# Patient Record
Sex: MALE | Race: WHITE | Employment: FULL TIME | URBAN - METROPOLITAN AREA
[De-identification: names, ages, dates, MRNs, and addresses within clinical notes are randomized per-mention and may not be internally consistent; named-entity substitution may affect disease eponyms.]

---

## 2017-04-03 ENCOUNTER — OFFICE VISIT (OUTPATIENT)
Dept: PULMONOLOGY | Facility: CLINIC | Age: 37
End: 2017-04-03

## 2017-04-03 VITALS
HEIGHT: 66 IN | WEIGHT: 205.19 LBS | BODY MASS INDEX: 32.98 KG/M2 | RESPIRATION RATE: 18 BRPM | SYSTOLIC BLOOD PRESSURE: 129 MMHG | DIASTOLIC BLOOD PRESSURE: 84 MMHG | HEART RATE: 85 BPM | OXYGEN SATURATION: 97 %

## 2017-04-03 DIAGNOSIS — G91.9 HYDROCEPHALY (HCC): Primary | ICD-10-CM

## 2017-04-03 PROCEDURE — 99213 OFFICE O/P EST LOW 20 MIN: CPT | Performed by: INTERNAL MEDICINE

## 2017-04-03 PROCEDURE — 99212 OFFICE O/P EST SF 10 MIN: CPT | Performed by: INTERNAL MEDICINE

## 2017-04-03 NOTE — PROGRESS NOTES
The patient is 79-year-old male who I know well from prior evaluation comes in now for follow-up. In general, he is doing well. 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   He returns now to

## 2017-04-21 NOTE — TELEPHONE ENCOUNTER
Pts mother called for refill:    Current outpatient prescriptions:   •  EPIPEN 2-MADY 0.3 MG/0.3ML Injection Solution Auto-injector, USE AS DIRECTED, Disp: 2 each, Rfl: 0

## 2017-04-22 RX ORDER — EPINEPHRINE 0.3 MG/.3ML
INJECTION SUBCUTANEOUS
Qty: 2 EACH | Refills: 0 | Status: SHIPPED | OUTPATIENT
Start: 2017-04-22 | End: 2018-05-10

## 2017-10-02 PROBLEM — R45.4 ANGER: Status: ACTIVE | Noted: 2017-10-02

## 2017-10-02 NOTE — PROGRESS NOTES
The patient is a 45-year-old male who comes in now noting that he has been getting angry and very small issues in his life. He specifically requests medication therapy. Otherwise, he is been doing fairly well. There is no further pain with swallowing.

## 2017-11-27 ENCOUNTER — TELEPHONE (OUTPATIENT)
Dept: PULMONOLOGY | Facility: CLINIC | Age: 37
End: 2017-11-27

## 2018-04-20 NOTE — TELEPHONE ENCOUNTER
Last seen 10-7-17  Last refill 10-2-17  Rx routed to [facility] for sign off.

## 2018-04-23 RX ORDER — ESCITALOPRAM OXALATE 10 MG/1
TABLET ORAL
Qty: 30 TABLET | Refills: 3 | Status: SHIPPED | OUTPATIENT
Start: 2018-04-23 | End: 2018-09-11

## 2018-05-11 NOTE — TELEPHONE ENCOUNTER
Last seen 10/02/2017  Last refill 04/22/2017  Routed to [place] for sign off

## 2018-05-12 RX ORDER — EPINEPHRINE 0.3 MG/.3ML
INJECTION SUBCUTANEOUS
Qty: 2 EACH | Refills: 0 | Status: SHIPPED | OUTPATIENT
Start: 2018-05-12 | End: 2021-05-05

## 2018-05-18 ENCOUNTER — TELEPHONE (OUTPATIENT)
Dept: PULMONOLOGY | Facility: CLINIC | Age: 38
End: 2018-05-18

## 2018-05-18 NOTE — TELEPHONE ENCOUNTER
Pt informed of below. Pt states he will call the [facility] in [facility] to have prescription transferred.

## 2018-05-18 NOTE — TELEPHONE ENCOUNTER
Last OV 10-2-17. Last refill 5-12-18. Shonna Gerard from [place] [address] states they did receive prescription on 5-12-18 but are currently on backorder. Shonna Gerard states the [address] [facility] do have the medication in stock.   R

## 2018-05-18 NOTE — TELEPHONE ENCOUNTER
Pt requesting refill for rx:EPINEPHRINE, indicates the current one he has , pls call pt at:[phone],thanks.   Current Outpatient Prescriptions:   •  EPINEPHRINE 0.3 MG/0.3ML Injection Solution Auto-injector, USE AS DIRECTED, Disp: 2 each, Rfl: 0

## 2018-09-12 RX ORDER — ESCITALOPRAM OXALATE 10 MG/1
TABLET ORAL
Qty: 30 TABLET | Refills: 3 | Status: SHIPPED | OUTPATIENT
Start: 2018-09-12 | End: 2019-02-25

## 2019-02-09 ENCOUNTER — HOSPITAL ENCOUNTER (OUTPATIENT)
Dept: GENERAL RADIOLOGY | Age: 39
Discharge: HOME | End: 2019-02-09
Attending: PODIATRIST
Payer: COMMERCIAL

## 2019-02-09 DIAGNOSIS — G89.29 CHRONIC PAIN OF LEFT HEEL: ICD-10-CM

## 2019-02-09 DIAGNOSIS — M79.672 CHRONIC PAIN OF LEFT HEEL: ICD-10-CM

## 2019-02-09 DIAGNOSIS — M72.2 PLANTAR FASCIITIS OF LEFT FOOT: ICD-10-CM

## 2019-02-09 PROCEDURE — 73630 X-RAY EXAM OF FOOT: CPT | Performed by: PODIATRIST

## 2019-02-25 RX ORDER — ESCITALOPRAM OXALATE 10 MG/1
TABLET ORAL
Qty: 30 TABLET | Refills: 0 | Status: SHIPPED | OUTPATIENT
Start: 2019-02-25 | End: 2019-03-27

## 2019-03-28 RX ORDER — ESCITALOPRAM OXALATE 10 MG/1
TABLET ORAL
Qty: 30 TABLET | Refills: 0 | Status: SHIPPED | OUTPATIENT
Start: 2019-03-28 | End: 2019-04-22

## 2019-03-28 NOTE — TELEPHONE ENCOUNTER
Spoke with pt. Pt states he picked up his prescription today. Pt informed last office visit was October 2017 and follow-up visit needed for additional refills. Appointment made for Monday April 22 at 4:30 PM. Informed pt of date, place, and time.  Pt verbal

## 2019-03-28 NOTE — TELEPHONE ENCOUNTER
Last office visit 10-2-17  Last refill 2-25-19    Pt needs follow-up appointment for additional refills.

## 2019-04-22 NOTE — PROGRESS NOTES
The patient is a 65-year-old male who comes in now for follow-up. He notes that he still has some issues with depression and anger. He notes that the Lexapro has helped somewhat but incompletely.   He does not want to pursue psychiatry evaluation at this

## 2019-05-11 ENCOUNTER — OFFICE VISIT (OUTPATIENT)
Dept: SLEEP CENTER | Age: 39
End: 2019-05-11
Attending: INTERNAL MEDICINE
Payer: COMMERCIAL

## 2019-05-11 DIAGNOSIS — G47.33 OSA (OBSTRUCTIVE SLEEP APNEA): ICD-10-CM

## 2019-05-11 PROCEDURE — 95810 POLYSOM 6/> YRS 4/> PARAM: CPT

## 2019-05-15 NOTE — PROCEDURES
[facility]  Accredited by the [facility] ([facility])    PATIENT'S NAME: Jessica Castellon   ATTENDING PHYSICIAN: Ruben Rodriguez MD   REFERRING PHYSICIAN: Ruben Rodriguez MD   PATIENT ACCOUNT #: [de-identified] LOCATION: Sleep index 33.3 events per hour for a combined arousal index of 48.8 events per hour.   There were 37 periodic limb movements for a periodic limb movement index of 6.8 events per hour of which 2.9 per hour were associated with arousal.  The lowest desaturation w

## 2019-05-16 ENCOUNTER — TELEPHONE (OUTPATIENT)
Dept: PULMONOLOGY | Facility: CLINIC | Age: 39
End: 2019-05-16

## 2019-05-16 DIAGNOSIS — G47.33 OSA (OBSTRUCTIVE SLEEP APNEA): Primary | ICD-10-CM

## 2019-05-16 NOTE — TELEPHONE ENCOUNTER
----- Message from Franklyn Beckman MD sent at 5/15/2019  9:09 PM CDT -----  RN, please call the patient to let him know that he has mild obstructive sleep apnea which becomes severe in rem sleep. Please facilitate  CPAP titration.

## 2019-05-17 NOTE — TELEPHONE ENCOUNTER
Spoke with pt who given the ok to speak with his mom regarding [address] message below. Pt Mom voiced understanding and states they already have the number to sleep lab.

## 2019-05-31 ENCOUNTER — TELEPHONE (OUTPATIENT)
Dept: PULMONOLOGY | Facility: CLINIC | Age: 39
End: 2019-05-31

## 2019-07-01 ENCOUNTER — HOSPITAL ENCOUNTER (OUTPATIENT)
Dept: CV DIAGNOSTICS | Facility: HOSPITAL | Age: 39
Discharge: HOME | End: 2019-07-01
Attending: INTERNAL MEDICINE
Payer: COMMERCIAL

## 2019-07-01 DIAGNOSIS — I42.9 CARDIOMYOPATHY, UNSPECIFIED TYPE (HCC): ICD-10-CM

## 2019-07-01 PROCEDURE — 93017 CV STRESS TEST TRACING ONLY: CPT | Performed by: INTERNAL MEDICINE

## 2019-07-01 PROCEDURE — 93016 CV STRESS TEST SUPVJ ONLY: CPT | Performed by: INTERNAL MEDICINE

## 2019-07-01 PROCEDURE — 93018 CV STRESS TEST I&R ONLY: CPT | Performed by: INTERNAL MEDICINE

## 2019-07-09 ENCOUNTER — TELEPHONE (OUTPATIENT)
Dept: PULMONOLOGY | Facility: CLINIC | Age: 39
End: 2019-07-09

## 2019-07-09 NOTE — TELEPHONE ENCOUNTER
----- Message from Ryan Hyde MD sent at 7/5/2019  3:39 PM CDT -----  RN, please let the patient know that his EKG simply shows extra heartbeats.

## 2019-09-27 RX ORDER — ESCITALOPRAM OXALATE 10 MG/1
TABLET ORAL
Qty: 30 TABLET | Refills: 0 | Status: SHIPPED | OUTPATIENT
Start: 2019-09-27 | End: 2019-10-17

## 2019-10-19 RX ORDER — ESCITALOPRAM OXALATE 10 MG/1
TABLET ORAL
Qty: 30 TABLET | Refills: 0 | Status: SHIPPED | OUTPATIENT
Start: 2019-10-19 | End: 2020-01-30

## 2019-11-05 ENCOUNTER — TELEPHONE (OUTPATIENT)
Dept: PULMONOLOGY | Facility: CLINIC | Age: 39
End: 2019-11-05

## 2019-11-05 RX ORDER — ALPRAZOLAM 0.5 MG/1
0.5 TABLET ORAL EVERY 6 HOURS PRN
Qty: 30 TABLET | Refills: 2 | Status: SHIPPED | OUTPATIENT
Start: 2019-11-05 | End: 2020-01-23

## 2019-11-05 NOTE — TELEPHONE ENCOUNTER
Pt informed of Dr. Dar Mcgee message/order below. Prescription faxed to [facility] [phone]. Dr. Adi Chavez- Pt wondering if he should continue taking Lexapro 20mg daily if he starts taking Xanax.

## 2019-11-05 NOTE — TELEPHONE ENCOUNTER
Spoke with pt regarding message below. Pt states he has been feeling stressed, angry, and mad because his mom is in hospice and is disappointed with mom's oncologist. Pt denies suicidal thoughts and no harm to self or others.  Pt currently takes Lexapro 20m

## 2019-11-05 NOTE — TELEPHONE ENCOUNTER
Pts daughter Tenzin Celestin states that pts mother is in hospice and pt is having a hard time dealing with it. She would like to know if pt can have RX to help with stress/nerves. Please call.

## 2019-11-05 NOTE — TELEPHONE ENCOUNTER
RN, is okay from my perspective for the patient to use Xanax 0.5 mg every 6 as needed #30, refill x2.

## 2019-11-18 RX ORDER — ESCITALOPRAM OXALATE 10 MG/1
TABLET ORAL
Qty: 30 TABLET | Refills: 0 | Status: SHIPPED | OUTPATIENT
Start: 2019-11-18 | End: 2019-12-23

## 2019-12-23 RX ORDER — ESCITALOPRAM OXALATE 10 MG/1
TABLET ORAL
Qty: 60 TABLET | Refills: 0 | Status: SHIPPED | OUTPATIENT
Start: 2019-12-23 | End: 2020-01-30

## 2020-01-23 RX ORDER — ALPRAZOLAM 0.5 MG/1
TABLET ORAL
Qty: 30 TABLET | Refills: 0 | Status: SHIPPED | OUTPATIENT
Start: 2020-01-23 | End: 2020-02-20

## 2020-01-30 RX ORDER — ESCITALOPRAM OXALATE 10 MG/1
20 TABLET ORAL
Qty: 60 TABLET | Refills: 0 | Status: SHIPPED | OUTPATIENT
Start: 2020-01-30 | End: 2020-01-31

## 2020-01-30 NOTE — TELEPHONE ENCOUNTER
Current Outpatient Medications   Medication Sig Dispense Refill   • ESCITALOPRAM 10 MG Oral Tab TAKE 2 TABLETS BY MOUTH DAILY.  60 tablet 0

## 2020-01-31 ENCOUNTER — TELEPHONE (OUTPATIENT)
Dept: PULMONOLOGY | Facility: CLINIC | Age: 40
End: 2020-01-31

## 2020-01-31 RX ORDER — ESCITALOPRAM OXALATE 20 MG/1
20 TABLET ORAL DAILY
Qty: 30 TABLET | Refills: 0 | Status: SHIPPED | OUTPATIENT
Start: 2020-01-31 | End: 2020-02-27

## 2020-01-31 NOTE — TELEPHONE ENCOUNTER
Current Outpatient Medications   Medication Sig Dispense Refill   • escitalopram 10 MG Oral Tab Take 2 tablets (20 mg total) by mouth once daily.  60 tablet 0     Per pharmacy pt's plan will charge a lower co-pay if prescription is changed to a higher stren

## 2020-01-31 NOTE — TELEPHONE ENCOUNTER
Refill for escitalopram 10mg tabs take 2 tabs daily, #60 dispensed signed by [facility] 1/30/20. Rx discontinued and Rx at the same dosing (20 mg daily, #30) sent to pharmacy.

## 2020-02-21 RX ORDER — ALPRAZOLAM 0.5 MG/1
TABLET ORAL
Qty: 30 TABLET | Refills: 0 | Status: SHIPPED | OUTPATIENT
Start: 2020-02-21 | End: 2020-03-25

## 2020-02-27 RX ORDER — ESCITALOPRAM OXALATE 20 MG/1
20 TABLET ORAL DAILY
Qty: 30 TABLET | Refills: 0 | Status: SHIPPED | OUTPATIENT
Start: 2020-02-27 | End: 2020-03-25

## 2020-02-27 NOTE — TELEPHONE ENCOUNTER
Current Outpatient Medications   Medication Sig Dispense Refill   • escitalopram 20 MG Oral Tab Take 1 tablet (20 mg total) by mouth daily.  30 tablet 0

## 2020-02-27 NOTE — TELEPHONE ENCOUNTER
Received refill request for Escitalopram 20 MG- 1 tablet by mouth daily. LOV 4-22-19    Refill [ended and forwarded to Dr. Stormy Hernandes for further directions.

## 2020-03-24 ENCOUNTER — TELEPHONE (OUTPATIENT)
Dept: PULMONOLOGY | Facility: CLINIC | Age: 40
End: 2020-03-24

## 2020-03-25 RX ORDER — ESCITALOPRAM OXALATE 20 MG/1
TABLET ORAL
Qty: 30 TABLET | Refills: 0 | Status: SHIPPED | OUTPATIENT
Start: 2020-03-25 | End: 2020-04-29

## 2020-03-25 RX ORDER — ALPRAZOLAM 0.5 MG/1
TABLET ORAL
Qty: 30 TABLET | Refills: 0 | Status: SHIPPED | OUTPATIENT
Start: 2020-03-25 | End: 2020-06-02

## 2020-04-29 RX ORDER — ESCITALOPRAM OXALATE 20 MG/1
20 TABLET ORAL DAILY
Qty: 30 TABLET | Refills: 0 | Status: SHIPPED | OUTPATIENT
Start: 2020-04-29 | End: 2020-06-05

## 2020-04-29 NOTE — TELEPHONE ENCOUNTER
Current Outpatient Medications   Medication Sig Dispense Refill   • ESCITALOPRAM 20 MG Oral Tab TAKE 1 TABLET(20 MG) BY MOUTH DAILY 30 tablet 0

## 2020-06-02 RX ORDER — ALPRAZOLAM 0.5 MG/1
TABLET ORAL
Qty: 30 TABLET | Refills: 0 | Status: SHIPPED | OUTPATIENT
Start: 2020-06-02 | End: 2020-07-02

## 2020-06-05 RX ORDER — ESCITALOPRAM OXALATE 20 MG/1
20 TABLET ORAL DAILY
Qty: 30 TABLET | Refills: 5 | Status: SHIPPED | OUTPATIENT
Start: 2020-06-05 | End: 2020-12-05

## 2020-07-02 RX ORDER — ALPRAZOLAM 0.5 MG/1
0.5 TABLET ORAL EVERY 6 HOURS PRN
Qty: 30 TABLET | Refills: 2 | Status: SHIPPED
Start: 2020-07-02 | End: 2020-10-21

## 2020-07-02 NOTE — TELEPHONE ENCOUNTER
Current Outpatient Medications   Medication Sig Dispense Refill   • ALPRAZOLAM 0.5 MG Oral Tab TAKE 1 TABLET BY MOUTH EVERY 6 HOURS AS NEEDED 30 tablet 0

## 2020-10-21 RX ORDER — ALPRAZOLAM 0.5 MG/1
0.5 TABLET ORAL EVERY 6 HOURS PRN
Qty: 30 TABLET | Refills: 0 | Status: SHIPPED | OUTPATIENT
Start: 2020-10-21 | End: 2021-01-14

## 2020-10-21 NOTE — TELEPHONE ENCOUNTER
Jean Al •  ALPRAZolam 0.5 MG Oral Tab, Take 1 tablet (0.5 mg total) by mouth every 6 (six) hours as needed. , Disp: 30 tablet, Rfl: 2

## 2020-10-21 NOTE — TELEPHONE ENCOUNTER
LOV 4/22/19  Last refill: 7/2/20, #30, 2 refills. Spoke with patient and booked appt for 1/25/21. Dr. Alla Camarena, patient requesting lab orders prior to appt.

## 2020-12-04 NOTE — TELEPHONE ENCOUNTER
Current Outpatient Medications   Medication Sig Dispense Refill   • escitalopram 20 MG Oral Tab Take 1 tablet (20 mg total) by mouth daily.  30 tablet 5

## 2020-12-05 RX ORDER — ESCITALOPRAM OXALATE 20 MG/1
20 TABLET ORAL DAILY
Qty: 30 TABLET | Refills: 5 | Status: SHIPPED | OUTPATIENT
Start: 2020-12-05

## 2021-01-04 ENCOUNTER — TELEPHONE (OUTPATIENT)
Dept: PULMONOLOGY | Facility: CLINIC | Age: 41
End: 2021-01-04

## 2021-01-07 NOTE — TELEPHONE ENCOUNTER
Last office visit: 4/22/19  Upcoming appointment: 1/25/21    Spoke with patient requesting dosage increase of alprazolam to 10mg. Current dose is 0.5mg PRN, takes daily. Dr. Galileo Narayanan - Patient does not feel any relief with alprazolam 0.5mg dose.   Reques

## 2021-01-07 NOTE — TELEPHONE ENCOUNTER
RN, can increase the current alprazolam dosing to 1.0 mg, not 10 mg. Also, please refer him to Dr. Cookie Ann or Dr. Silvia Curiel. Both are excellent resources.

## 2021-01-08 ENCOUNTER — TELEPHONE (OUTPATIENT)
Dept: PULMONOLOGY | Facility: CLINIC | Age: 41
End: 2021-01-08

## 2021-01-08 NOTE — TELEPHONE ENCOUNTER
[facility] patients Aunt calling to request referral or recommendation for a psychiatrist who would be in network with his insurance. Please call at [phone],[phone].   *informed  not in clinic today

## 2021-01-11 NOTE — TELEPHONE ENCOUNTER
Spoke with patient informed him of Dr. Ambrosio Standing message/order/recommendation. Patient verbalized understanding. Patient states he will inform his aunt that he received recommendation to psychiatrist. See TE 1/8/21.

## 2021-01-14 RX ORDER — ALPRAZOLAM 1 MG/1
1 TABLET ORAL EVERY 6 HOURS PRN
Qty: 30 TABLET | Refills: 0 | Status: SHIPPED | OUTPATIENT
Start: 2021-01-14 | End: 2021-05-10

## 2021-01-14 NOTE — TELEPHONE ENCOUNTER
Pharmacy requesting refill:          Current Outpatient Medications:     •  ALPRAZolam 0.5 MG Oral Tab, Take 1 tablet (0.5 mg total) by mouth every 6 (six) hours as needed. , Disp: 30 tablet, Rfl: 0

## 2021-01-14 NOTE — TELEPHONE ENCOUNTER
Last office visit - 4/22/19  Scheduled visit - 1/25/21  Last refill - 10/21/20    Dr. Steven Turner - Please review pended order for alprazolam.  See TE 1/4/21, dosage increased to 1mg. If agreeable, please sign.

## 2021-01-25 ENCOUNTER — OFFICE VISIT (OUTPATIENT)
Dept: PULMONOLOGY | Facility: CLINIC | Age: 41
End: 2021-01-25
Payer: COMMERCIAL

## 2021-01-25 VITALS
HEART RATE: 77 BPM | DIASTOLIC BLOOD PRESSURE: 90 MMHG | WEIGHT: 204 LBS | HEIGHT: 66 IN | SYSTOLIC BLOOD PRESSURE: 140 MMHG | OXYGEN SATURATION: 97 % | BODY MASS INDEX: 32.78 KG/M2

## 2021-01-25 DIAGNOSIS — E78.5 DYSLIPIDEMIA: ICD-10-CM

## 2021-01-25 DIAGNOSIS — R35.0 FREQUENT URINATION: ICD-10-CM

## 2021-01-25 DIAGNOSIS — G91.9 HYDROCEPHALUS, UNSPECIFIED TYPE (HCC): Primary | ICD-10-CM

## 2021-01-25 PROCEDURE — 3080F DIAST BP >= 90 MM HG: CPT | Performed by: INTERNAL MEDICINE

## 2021-01-25 PROCEDURE — 3077F SYST BP >= 140 MM HG: CPT | Performed by: INTERNAL MEDICINE

## 2021-01-25 PROCEDURE — 3008F BODY MASS INDEX DOCD: CPT | Performed by: INTERNAL MEDICINE

## 2021-01-25 PROCEDURE — 99214 OFFICE O/P EST MOD 30 MIN: CPT | Performed by: INTERNAL MEDICINE

## 2021-01-25 NOTE — PROGRESS NOTES
The patient is 29-year-old male who Ravinder from prior evaluation comes in now for follow-up. He notes that his depression is doing fairly well with the Lexapro 20-minute the higher dose helps.   He was supposed to follow-up with a psychologist Jd Kaba urinalysis as well as glycohemoglobin  8. Mild JACQUELIN–patient to consider follow-up with CPAP. Weight loss, avoid alcohol, avoid sedating drug and never drive if sleepy  9.   Arthritis–Tylenol as needed and be careful about taking too much ibuprofen

## 2021-01-26 ENCOUNTER — LAB ENCOUNTER (OUTPATIENT)
Dept: LAB | Age: 41
End: 2021-01-26
Attending: INTERNAL MEDICINE
Payer: COMMERCIAL

## 2021-01-26 DIAGNOSIS — R35.0 FREQUENT URINATION: ICD-10-CM

## 2021-01-26 DIAGNOSIS — E78.5 DYSLIPIDEMIA: ICD-10-CM

## 2021-01-26 LAB
ANION GAP SERPL CALC-SCNC: 4 MMOL/L (ref 0–18)
BILIRUB UR QL: NEGATIVE
BUN BLD-MCNC: 15 MG/DL (ref 7–18)
BUN/CREAT SERPL: 14.7 (ref 10–20)
CALCIUM BLD-MCNC: 9 MG/DL (ref 8.5–10.1)
CHLORIDE SERPL-SCNC: 105 MMOL/L (ref 98–112)
CHOLEST SMN-MCNC: 240 MG/DL (ref ?–200)
CLARITY UR: CLEAR
CO2 SERPL-SCNC: 30 MMOL/L (ref 21–32)
COLOR UR: YELLOW
CREAT BLD-MCNC: 1.02 MG/DL
EST. AVERAGE GLUCOSE BLD GHB EST-MCNC: 108 MG/DL (ref 68–126)
GLUCOSE BLD-MCNC: 100 MG/DL (ref 70–99)
GLUCOSE UR-MCNC: NEGATIVE MG/DL
HBA1C MFR BLD HPLC: 5.4 % (ref ?–5.7)
HDLC SERPL-MCNC: 42 MG/DL (ref 40–59)
HGB UR QL STRIP.AUTO: NEGATIVE
KETONES UR-MCNC: NEGATIVE MG/DL
LDLC SERPL CALC-MCNC: 154 MG/DL (ref ?–100)
LEUKOCYTE ESTERASE UR QL STRIP.AUTO: NEGATIVE
NITRITE UR QL STRIP.AUTO: NEGATIVE
NONHDLC SERPL-MCNC: 198 MG/DL (ref ?–130)
OSMOLALITY SERPL CALC.SUM OF ELEC: 289 MOSM/KG (ref 275–295)
PATIENT FASTING Y/N/NP: YES
PATIENT FASTING Y/N/NP: YES
PH UR: 6 [PH] (ref 5–8)
POTASSIUM SERPL-SCNC: 4.1 MMOL/L (ref 3.5–5.1)
PROT UR-MCNC: NEGATIVE MG/DL
SODIUM SERPL-SCNC: 139 MMOL/L (ref 136–145)
SP GR UR STRIP: 1.02 (ref 1–1.03)
TRIGL SERPL-MCNC: 220 MG/DL (ref 30–149)
UROBILINOGEN UR STRIP-ACNC: <2
VLDLC SERPL CALC-MCNC: 44 MG/DL (ref 0–30)

## 2021-01-26 PROCEDURE — 36415 COLL VENOUS BLD VENIPUNCTURE: CPT

## 2021-01-26 PROCEDURE — 80061 LIPID PANEL: CPT

## 2021-01-26 PROCEDURE — 83036 HEMOGLOBIN GLYCOSYLATED A1C: CPT

## 2021-01-26 PROCEDURE — 80048 BASIC METABOLIC PNL TOTAL CA: CPT

## 2021-01-26 PROCEDURE — 81003 URINALYSIS AUTO W/O SCOPE: CPT

## 2021-02-01 ENCOUNTER — TELEPHONE (OUTPATIENT)
Dept: PULMONOLOGY | Facility: CLINIC | Age: 41
End: 2021-02-01

## 2021-02-01 DIAGNOSIS — E78.5 DYSLIPIDEMIA: Primary | ICD-10-CM

## 2021-02-02 RX ORDER — ATORVASTATIN CALCIUM 10 MG/1
10 TABLET, FILM COATED ORAL NIGHTLY
Qty: 30 TABLET | Refills: 5 | Status: SHIPPED | OUTPATIENT
Start: 2021-02-02 | End: 2021-08-12

## 2021-02-02 NOTE — TELEPHONE ENCOUNTER
----- Message from Jami Oliver MD sent at 2/2/2021  1:56 PM CST -----  RN, please call the patient to let them know that there was no evidence of diabetes, but his cholesterol is too high.   Please initiate atorvastatin 10 mg p.o. daily and repeat the

## 2021-02-04 NOTE — TELEPHONE ENCOUNTER
Spoke with patient informed him of Dr. Rose Pole result note/order below. Patient verbalized understanding. LFT and lipid panel ordered and placed in chronic calendar.

## 2021-02-04 NOTE — TELEPHONE ENCOUNTER
From: Maritza Zaman  To: Shaina Mc MD  Sent: 2/3/2021 7:36 PM CST  Subject: Referral Request    I was supposed to have a therapy session on a zoom meeting in January but I couldn't figure out how the use it and I missed my appt.  Is there a way I c

## 2021-03-15 ENCOUNTER — TELEPHONE (OUTPATIENT)
Dept: PULMONOLOGY | Facility: CLINIC | Age: 41
End: 2021-03-15

## 2021-03-15 NOTE — TELEPHONE ENCOUNTER
Olga Lidia @ [facility] is aware that pt not due for lipid/hepatic function panel until 5/2021.  Notified pt per Dr. Agnes Miles result note 2/2/21 that he is not due for lipid/hepatic function panel until 5/2021/earliest 4/26/21 (3 mo interval, last lipid 1/26/21

## 2021-03-15 NOTE — TELEPHONE ENCOUNTER
Sent call to Rn - Labs calling to obtain orders for labs,  Patient is trying to register. Please call. Thank you.

## 2021-03-15 NOTE — TELEPHONE ENCOUNTER
Pt is calling and states he just ran out of his cholesterol med and Dr wants him to do blood work.  He knows there are orders in for labs but want to know when he is supposed to get the labs done./Please call

## 2021-03-15 NOTE — TELEPHONE ENCOUNTER
Spoke to patient regarding message below. Patient states he took his last atorvastatin 2 days ago and will be getting his lab work today at [facility] location. Labs ordered: lipid panel and Hepatic Function.   Will notify [address] [place] [address] office if anything further is

## 2021-04-11 ENCOUNTER — IMMUNIZATION (OUTPATIENT)
Dept: LAB | Age: 41
End: 2021-04-11
Attending: HOSPITALIST
Payer: COMMERCIAL

## 2021-04-11 DIAGNOSIS — Z23 NEED FOR VACCINATION: Primary | ICD-10-CM

## 2021-04-11 PROCEDURE — 0001A SARSCOV2 VAC 30MCG/0.3ML IM: CPT

## 2021-04-12 ENCOUNTER — TELEPHONE (OUTPATIENT)
Dept: PULMONOLOGY | Facility: CLINIC | Age: 41
End: 2021-04-12

## 2021-05-02 ENCOUNTER — IMMUNIZATION (OUTPATIENT)
Dept: LAB | Age: 41
End: 2021-05-02
Attending: HOSPITALIST
Payer: COMMERCIAL

## 2021-05-02 DIAGNOSIS — Z23 NEED FOR VACCINATION: Primary | ICD-10-CM

## 2021-05-02 PROCEDURE — 0002A SARSCOV2 VAC 30MCG/0.3ML IM: CPT

## 2021-05-05 NOTE — TELEPHONE ENCOUNTER
Pharmacy called for refill:      Current Outpatient Medications:     •  EPINEPHRINE 0.3 MG/0.3ML Injection Solution Auto-injector, USE AS DIRECTED, Disp: 2 each, Rfl: 0

## 2021-05-07 NOTE — TELEPHONE ENCOUNTER
Current Outpatient Medications   Medication Sig Dispense Refill   • ALPRAZolam 1 MG Oral Tab Take 1 tablet (1 mg total) by mouth every 6 (six) hours as needed. 30 tablet 0     Please check dose. Pharmacy request for Alprazolam 0.05MG Tablets.

## 2021-05-10 RX ORDER — ALPRAZOLAM 1 MG/1
1 TABLET ORAL EVERY 6 HOURS PRN
Qty: 30 TABLET | Refills: 0 | Status: SHIPPED | OUTPATIENT
Start: 2021-05-10 | End: 2021-09-03

## 2021-05-12 RX ORDER — EPINEPHRINE 0.3 MG/.3ML
INJECTION SUBCUTANEOUS
Qty: 2 EACH | Refills: 0 | Status: SHIPPED | OUTPATIENT
Start: 2021-05-12

## 2021-06-07 ENCOUNTER — TELEPHONE (OUTPATIENT)
Dept: PULMONOLOGY | Facility: CLINIC | Age: 41
End: 2021-06-07

## 2021-06-08 ENCOUNTER — TELEPHONE (OUTPATIENT)
Dept: PULMONOLOGY | Facility: CLINIC | Age: 41
End: 2021-06-08

## 2021-06-08 NOTE — TELEPHONE ENCOUNTER
LOV 1/25/21  LR 5/12/21 for qty 2  Explained per Rj at [facility] that they vd order for Epinephrine injection solution auto-injector on 5/26, but script on hold.  Informed him that he may have hepatic function & lipid panel testing now and to fas

## 2021-06-08 NOTE — TELEPHONE ENCOUNTER
Pt called in to get refill on medication he states he needs it as soon as possible EPINEPHrine 0.3 MG/0.3ML Injection Solution Auto-injector.  Please follow up

## 2021-06-14 ENCOUNTER — LAB ENCOUNTER (OUTPATIENT)
Dept: LAB | Age: 41
End: 2021-06-14
Attending: INTERNAL MEDICINE
Payer: COMMERCIAL

## 2021-06-14 ENCOUNTER — PATIENT MESSAGE (OUTPATIENT)
Dept: PULMONOLOGY | Facility: CLINIC | Age: 41
End: 2021-06-14

## 2021-06-14 DIAGNOSIS — E78.5 DYSLIPIDEMIA: ICD-10-CM

## 2021-06-14 PROCEDURE — 80076 HEPATIC FUNCTION PANEL: CPT

## 2021-06-14 PROCEDURE — 80061 LIPID PANEL: CPT

## 2021-06-14 PROCEDURE — 36415 COLL VENOUS BLD VENIPUNCTURE: CPT

## 2021-06-15 NOTE — TELEPHONE ENCOUNTER
From: Melina Berger  To: Aalina Jackson MD  Sent: 6/14/2021 2:51 PM CDT  Subject: Non-Urgent Medical Question    Is everything good. Finally I'm able to read my chart.

## 2021-07-05 ENCOUNTER — HOSPITAL ENCOUNTER (OUTPATIENT)
Age: 41
Discharge: HOME | End: 2021-07-05
Payer: COMMERCIAL

## 2021-07-05 VITALS
SYSTOLIC BLOOD PRESSURE: 144 MMHG | BODY MASS INDEX: 33.43 KG/M2 | WEIGHT: 208 LBS | RESPIRATION RATE: 16 BRPM | DIASTOLIC BLOOD PRESSURE: 96 MMHG | OXYGEN SATURATION: 97 % | HEART RATE: 83 BPM | HEIGHT: 66 IN | TEMPERATURE: 98 F

## 2021-07-05 DIAGNOSIS — J06.9 UPPER RESPIRATORY TRACT INFECTION, UNSPECIFIED TYPE: Primary | ICD-10-CM

## 2021-07-05 LAB — S PYO AG THROAT QL: NEGATIVE

## 2021-07-05 PROCEDURE — 99213 OFFICE O/P EST LOW 20 MIN: CPT | Performed by: NURSE PRACTITIONER

## 2021-07-05 PROCEDURE — 87880 STREP A ASSAY W/OPTIC: CPT | Performed by: NURSE PRACTITIONER

## 2021-07-05 RX ORDER — DEXAMETHASONE SODIUM PHOSPHATE 10 MG/ML
10 INJECTION, SOLUTION INTRAMUSCULAR; INTRAVENOUS ONCE
Status: COMPLETED | OUTPATIENT
Start: 2021-07-05 | End: 2021-07-05

## 2021-07-05 NOTE — ED PROVIDER NOTES
Patient Seen in: [facility]      History   Patient presents with:  Sore Throat    Stated Complaint: sore throat     HPI/Subjective:   Meghan Vincent is a 47year-old male presenting to the [facility] complaining of sore throat x 2 days.  P 144/96   Pulse 83   Temp 97.8 °F (36.6 °C) (Temporal)   Resp 16   Ht 167.6 cm (5' 6\")   Wt 94.3 kg   SpO2 97%   BMI 33.57 kg/m²         Physical Exam  Vitals and nursing note reviewed. Constitutional:       Appearance: Normal appearance.    HENT:      He or performed during the hospital encounter of 07/05/21   POCT Rapid Strep    Collection Time: 07/05/21 12:03 PM   Result Value Ref Range    POCT Rapid Strep Negative Negative       MDM   47year-old male presenting with sore throat, cough.  HPI and physical

## 2021-08-08 ENCOUNTER — HOSPITAL ENCOUNTER (EMERGENCY)
Facility: HOSPITAL | Age: 41
Discharge: HOME | End: 2021-08-08
Attending: EMERGENCY MEDICINE
Payer: COMMERCIAL

## 2021-08-08 ENCOUNTER — APPOINTMENT (OUTPATIENT)
Dept: GENERAL RADIOLOGY | Facility: HOSPITAL | Age: 41
End: 2021-08-08
Attending: EMERGENCY MEDICINE
Payer: COMMERCIAL

## 2021-08-08 VITALS
BODY MASS INDEX: 32.96 KG/M2 | HEART RATE: 90 BPM | SYSTOLIC BLOOD PRESSURE: 135 MMHG | TEMPERATURE: 98 F | HEIGHT: 67 IN | RESPIRATION RATE: 20 BRPM | OXYGEN SATURATION: 92 % | WEIGHT: 210 LBS | DIASTOLIC BLOOD PRESSURE: 93 MMHG

## 2021-08-08 DIAGNOSIS — R11.2 NAUSEA AND VOMITING, INTRACTABILITY OF VOMITING NOT SPECIFIED, UNSPECIFIED VOMITING TYPE: Primary | ICD-10-CM

## 2021-08-08 LAB
ALBUMIN SERPL-MCNC: 3.3 G/DL (ref 3.4–5)
ALP LIVER SERPL-CCNC: 70 U/L
ALT SERPL-CCNC: 46 U/L
ANION GAP SERPL CALC-SCNC: 7 MMOL/L (ref 0–18)
AST SERPL-CCNC: 21 U/L (ref 15–37)
BASOPHILS # BLD AUTO: 0.1 X10(3) UL (ref 0–0.2)
BASOPHILS NFR BLD AUTO: 1.2 %
BILIRUB DIRECT SERPL-MCNC: <0.1 MG/DL (ref 0–0.2)
BILIRUB SERPL-MCNC: 0.3 MG/DL (ref 0.1–2)
BILIRUB UR QL: NEGATIVE
BUN BLD-MCNC: 21 MG/DL (ref 7–18)
BUN/CREAT SERPL: 22.8 (ref 10–20)
CALCIUM BLD-MCNC: 9.2 MG/DL (ref 8.5–10.1)
CHLORIDE SERPL-SCNC: 104 MMOL/L (ref 98–112)
CLARITY UR: CLEAR
CO2 SERPL-SCNC: 29 MMOL/L (ref 21–32)
COLOR UR: YELLOW
CREAT BLD-MCNC: 0.92 MG/DL
DEPRECATED RDW RBC AUTO: 37.5 FL (ref 35.1–46.3)
EOSINOPHIL # BLD AUTO: 0.31 X10(3) UL (ref 0–0.7)
EOSINOPHIL NFR BLD AUTO: 3.6 %
ERYTHROCYTE [DISTWIDTH] IN BLOOD BY AUTOMATED COUNT: 12.5 % (ref 11–15)
GLUCOSE BLD-MCNC: 119 MG/DL (ref 70–99)
GLUCOSE UR-MCNC: NEGATIVE MG/DL
HCT VFR BLD AUTO: 43.9 %
HGB BLD-MCNC: 15 G/DL
HGB UR QL STRIP.AUTO: NEGATIVE
IMM GRANULOCYTES # BLD AUTO: 0.05 X10(3) UL (ref 0–1)
IMM GRANULOCYTES NFR BLD: 0.6 %
KETONES UR-MCNC: NEGATIVE MG/DL
LEUKOCYTE ESTERASE UR QL STRIP.AUTO: NEGATIVE
LIPASE SERPL-CCNC: 156 U/L (ref 73–393)
LYMPHOCYTES # BLD AUTO: 2.01 X10(3) UL (ref 1–4)
LYMPHOCYTES NFR BLD AUTO: 23.2 %
M PROTEIN MFR SERPL ELPH: 7 G/DL (ref 6.4–8.2)
MCH RBC QN AUTO: 28.4 PG (ref 26–34)
MCHC RBC AUTO-ENTMCNC: 34.2 G/DL (ref 31–37)
MCV RBC AUTO: 83 FL
MONOCYTES # BLD AUTO: 0.8 X10(3) UL (ref 0.1–1)
MONOCYTES NFR BLD AUTO: 9.2 %
NEUTROPHILS # BLD AUTO: 5.41 X10 (3) UL (ref 1.5–7.7)
NEUTROPHILS # BLD AUTO: 5.41 X10(3) UL (ref 1.5–7.7)
NEUTROPHILS NFR BLD AUTO: 62.2 %
NITRITE UR QL STRIP.AUTO: NEGATIVE
OSMOLALITY SERPL CALC.SUM OF ELEC: 294 MOSM/KG (ref 275–295)
PH UR: 6 [PH] (ref 5–8)
PLATELET # BLD AUTO: 273 10(3)UL (ref 150–450)
POTASSIUM SERPL-SCNC: 4 MMOL/L (ref 3.5–5.1)
PROT UR-MCNC: NEGATIVE MG/DL
RBC # BLD AUTO: 5.29 X10(6)UL
SODIUM SERPL-SCNC: 140 MMOL/L (ref 136–145)
SP GR UR STRIP: 1.02 (ref 1–1.03)
UROBILINOGEN UR STRIP-ACNC: <2
WBC # BLD AUTO: 8.7 X10(3) UL (ref 4–11)

## 2021-08-08 PROCEDURE — 80048 BASIC METABOLIC PNL TOTAL CA: CPT

## 2021-08-08 PROCEDURE — 83690 ASSAY OF LIPASE: CPT | Performed by: EMERGENCY MEDICINE

## 2021-08-08 PROCEDURE — 80076 HEPATIC FUNCTION PANEL: CPT | Performed by: EMERGENCY MEDICINE

## 2021-08-08 PROCEDURE — 74019 RADEX ABDOMEN 2 VIEWS: CPT | Performed by: EMERGENCY MEDICINE

## 2021-08-08 PROCEDURE — 85025 COMPLETE CBC W/AUTO DIFF WBC: CPT | Performed by: EMERGENCY MEDICINE

## 2021-08-08 PROCEDURE — 96375 TX/PRO/DX INJ NEW DRUG ADDON: CPT

## 2021-08-08 PROCEDURE — 81003 URINALYSIS AUTO W/O SCOPE: CPT

## 2021-08-08 PROCEDURE — 96374 THER/PROPH/DIAG INJ IV PUSH: CPT

## 2021-08-08 PROCEDURE — 80048 BASIC METABOLIC PNL TOTAL CA: CPT | Performed by: EMERGENCY MEDICINE

## 2021-08-08 PROCEDURE — 85025 COMPLETE CBC W/AUTO DIFF WBC: CPT

## 2021-08-08 PROCEDURE — 99284 EMERGENCY DEPT VISIT MOD MDM: CPT

## 2021-08-08 PROCEDURE — 81003 URINALYSIS AUTO W/O SCOPE: CPT | Performed by: EMERGENCY MEDICINE

## 2021-08-08 RX ORDER — ONDANSETRON 4 MG/1
4 TABLET, ORALLY DISINTEGRATING ORAL EVERY 4 HOURS PRN
Qty: 12 TABLET | Refills: 0 | Status: SHIPPED | OUTPATIENT
Start: 2021-08-08 | End: 2021-08-15

## 2021-08-08 RX ORDER — ESCITALOPRAM OXALATE 20 MG/1
20 TABLET ORAL DAILY
Qty: 30 TABLET | Refills: 0 | Status: SHIPPED | OUTPATIENT
Start: 2021-08-08 | End: 2021-09-08

## 2021-08-08 RX ORDER — POLYETHYLENE GLYCOL 3350 17 G/17G
17 POWDER, FOR SOLUTION ORAL DAILY PRN
Qty: 12 EACH | Refills: 0 | Status: SHIPPED | OUTPATIENT
Start: 2021-08-08 | End: 2021-09-07

## 2021-08-08 RX ORDER — ALPRAZOLAM 0.5 MG/1
0.5 TABLET ORAL 3 TIMES DAILY PRN
Qty: 12 TABLET | Refills: 0 | Status: SHIPPED | OUTPATIENT
Start: 2021-08-08 | End: 2021-08-15

## 2021-08-08 RX ORDER — LORAZEPAM 2 MG/ML
0.5 INJECTION INTRAMUSCULAR ONCE
Status: COMPLETED | OUTPATIENT
Start: 2021-08-08 | End: 2021-08-08

## 2021-08-08 RX ORDER — ONDANSETRON 2 MG/ML
4 INJECTION INTRAMUSCULAR; INTRAVENOUS ONCE
Status: COMPLETED | OUTPATIENT
Start: 2021-08-08 | End: 2021-08-08

## 2021-08-08 NOTE — ED INITIAL ASSESSMENT (HPI)
Patient here with c/o vomiting x 3 days. States he has been unable to take his anxiety medications due to this.

## 2021-08-09 NOTE — ED PROVIDER NOTES
Patient Seen in: [facility] AND [facility] Emergency Department    History   Patient presents with:  Nausea/vomiting    Stated Complaint: vomiting    HPI    Patient complains of vomiting, this began 3 days ago, non bloody, non billious.   not associated with diar 97.8 °F (36.6 °C)   Temp src Temporal   SpO2 98 %   O2 Device None (Room air)       Current:BP (!) 135/93   Pulse 90   Temp 97.8 °F (36.6 °C) (Temporal)   Resp 20   Ht 170.2 cm (5' 7\")   Wt 95.3 kg   SpO2 92%   BMI 32.89 kg/m²   PULSE OX The pulse oximete Re-Exam: feeling better will d/c      MDM      XR ABDOMEN OBSTRUCTIVE SERIES ROUTINE(2 VW)(CPT=74019)    Result Date: 8/8/2021  CONCLUSION: There is a moderate quantity of fecal material throughout the large bowel without small bowel dilatation.  Saw Marcus Tab  Take 1 tablet (20 mg total) by mouth daily. , Normal, Disp-30 tablet, R-0    !! ALPRAZolam 0.5 MG Oral Tab  Take 1 tablet (0.5 mg total) by mouth 3 (three) times daily as needed for Anxiety., Normal, Disp-12 tablet, R-0    ondansetron 4 MG Oral Tablet

## 2021-08-12 RX ORDER — ATORVASTATIN CALCIUM 10 MG/1
TABLET, FILM COATED ORAL
Qty: 30 TABLET | Refills: 1 | Status: SHIPPED | OUTPATIENT
Start: 2021-08-12 | End: 2021-10-04

## 2021-08-16 ENCOUNTER — HOSPITAL ENCOUNTER (OUTPATIENT)
Age: 41
Discharge: HOME | End: 2021-08-16
Attending: PHYSICIAN ASSISTANT
Payer: COMMERCIAL

## 2021-08-16 VITALS
WEIGHT: 205 LBS | SYSTOLIC BLOOD PRESSURE: 136 MMHG | OXYGEN SATURATION: 95 % | BODY MASS INDEX: 32.18 KG/M2 | DIASTOLIC BLOOD PRESSURE: 101 MMHG | TEMPERATURE: 99 F | HEIGHT: 67 IN | RESPIRATION RATE: 18 BRPM | HEART RATE: 74 BPM

## 2021-08-16 DIAGNOSIS — Z20.822 ENCOUNTER FOR LABORATORY TESTING FOR COVID-19 VIRUS: ICD-10-CM

## 2021-08-16 DIAGNOSIS — R03.0 ELEVATED BLOOD PRESSURE READING: Primary | ICD-10-CM

## 2021-08-16 LAB — SARS-COV-2 RNA RESP QL NAA+PROBE: NOT DETECTED

## 2021-08-16 PROCEDURE — U0002 COVID-19 LAB TEST NON-CDC: HCPCS | Performed by: PHYSICIAN ASSISTANT

## 2021-08-16 PROCEDURE — 3075F SYST BP GE 130 - 139MM HG: CPT | Performed by: PHYSICIAN ASSISTANT

## 2021-08-16 PROCEDURE — 3080F DIAST BP >= 90 MM HG: CPT | Performed by: PHYSICIAN ASSISTANT

## 2021-08-16 PROCEDURE — 3008F BODY MASS INDEX DOCD: CPT | Performed by: PHYSICIAN ASSISTANT

## 2021-08-16 PROCEDURE — 99213 OFFICE O/P EST LOW 20 MIN: CPT | Performed by: PHYSICIAN ASSISTANT

## 2021-08-16 NOTE — ED INITIAL ASSESSMENT (HPI)
Pt presents to the [address] [place] [address] with c/o wanting a covid test s/p [place]. Pt is asymptomatic at this time. Pt is fully vaccinated.

## 2021-08-16 NOTE — ED PROVIDER NOTES
Patient Seen in: [facility]    History   Patient presents with:  Covid-19 Test    Stated Complaint: covid test    HPI    Dominga Soares is a 46year old male who presents to immediate care requesting testing for COVID-19.   Patient states he r [08/16/21 1615]   BP (!) 142/101   Pulse 74   Resp 18   Temp 98.5 °F (36.9 °C)   Temp src Temporal   SpO2 95 %   O2 Device None (Room air)       Current:BP (!) 136/101   Pulse 74   Temp 98.5 °F (36.9 °C) (Temporal)   Resp 18   Ht 170.2 cm (5' 7\")   Wt 93 documented. Results reviewed with patient. I have given the patient instructions regarding their diagnoses, expectations, follow up, and ER precautions.  I explained to the patient that emergent conditions may arise and to go to the ER for new, worsenin

## 2021-09-03 RX ORDER — ALPRAZOLAM 1 MG/1
1 TABLET ORAL EVERY 6 HOURS PRN
Qty: 30 TABLET | Refills: 0 | Status: SHIPPED | OUTPATIENT
Start: 2021-09-03 | End: 2021-10-22

## 2021-09-03 NOTE — TELEPHONE ENCOUNTER
LOV 1/25/21  LR  5/10/21      Routed to [address] [place] [address] for sign off if agreeable.

## 2021-09-08 RX ORDER — ESCITALOPRAM OXALATE 20 MG/1
TABLET ORAL
Qty: 30 TABLET | Refills: 5 | Status: SHIPPED | OUTPATIENT
Start: 2021-09-08

## 2021-09-08 NOTE — TELEPHONE ENCOUNTER
Last office visit 1/25/21  Last refill 12/5/20    Dr. Mardeen Neighbor- Please review/sign pended refill request.

## 2021-09-21 NOTE — TELEPHONE ENCOUNTER
.  Current Outpatient Medications:   •  escitalopram 20 MG Oral Tab, Take 1 tablet (20 mg total) by mouth daily. , Disp: 30 tablet, Rfl: 0
Last office visit 4/22/2019  Last refill 4/29/2020    Dr. Cathi Mena- Please review/sign pended order.
no

## 2021-10-04 RX ORDER — ATORVASTATIN CALCIUM 10 MG/1
TABLET, FILM COATED ORAL
Qty: 30 TABLET | Refills: 2 | Status: SHIPPED | OUTPATIENT
Start: 2021-10-04 | End: 2022-01-03

## 2021-10-22 RX ORDER — ALPRAZOLAM 1 MG/1
TABLET ORAL
Qty: 30 TABLET | Refills: 2 | Status: SHIPPED | OUTPATIENT
Start: 2021-10-22 | End: 2022-02-07

## 2022-01-03 RX ORDER — ATORVASTATIN CALCIUM 10 MG/1
TABLET, FILM COATED ORAL
Qty: 30 TABLET | Refills: 2 | Status: SHIPPED | OUTPATIENT
Start: 2022-01-03

## 2022-01-03 NOTE — TELEPHONE ENCOUNTER
LOV: 1/25/21  Last refill: 10/4/21      Dr. José Miguel Meza -please review and sign pended order if agreeable.

## 2022-01-08 NOTE — TELEPHONE ENCOUNTER
Current Outpatient Medications   Medication Sig Dispense Refill   • ALPRAZOLAM 0.5 MG Oral Tab TAKE 1 TABLET BY MOUTH EVERY 6 HOURS AS NEEDED 30 tablet 0
LOV 4/22/19  Last refill 1/23/20
UNK

## 2022-01-11 ENCOUNTER — PATIENT MESSAGE (OUTPATIENT)
Dept: PULMONOLOGY | Facility: CLINIC | Age: 42
End: 2022-01-11

## 2022-01-14 NOTE — TELEPHONE ENCOUNTER
From: Samantha Burrell  To: Tino Johnson MD  Sent: 1/11/2022 2:25 PM CST  Subject: Gary Boas. I want to schedule my yearly psychic and I want blood work ran and I have [address]. Monday's are best for me. My number is [phone]. Thx.  Hap

## 2022-01-21 ENCOUNTER — TELEPHONE (OUTPATIENT)
Dept: PULMONOLOGY | Facility: CLINIC | Age: 42
End: 2022-01-21

## 2022-01-21 DIAGNOSIS — Z00.00 ENCOUNTER FOR PHYSICAL EXAMINATION: Primary | ICD-10-CM

## 2022-01-21 NOTE — TELEPHONE ENCOUNTER
RN, please facilitate comprehensive metabolic panel, CBC, TSH, proBNP, lipid panel, glycohemoglobin.

## 2022-01-21 NOTE — TELEPHONE ENCOUNTER
Dr. Iona Singletary, patient is requesting for lab orders. No upcoming appointment but is planning to schedule a physical soon. Is requesting full set of labs and to check for diabetes.

## 2022-01-21 NOTE — TELEPHONE ENCOUNTER
----- Message from [facility]. April sent at 1/20/2022  5:43 PM CST -----  Regarding: YEARLY PSYCHIAL. Regular bloodwork.   Diabetics check up

## 2022-02-07 RX ORDER — ALPRAZOLAM 1 MG/1
TABLET ORAL
Qty: 30 TABLET | Refills: 2 | Status: SHIPPED | OUTPATIENT
Start: 2022-02-07

## 2022-02-07 NOTE — TELEPHONE ENCOUNTER
LOV: 1/25/2021  Last refill: 10/22/2021    Dr. Sabrina Montgomery review and sign pended for ALPRAZOLAM if agreeable.

## 2022-02-25 ENCOUNTER — TELEPHONE (OUTPATIENT)
Dept: PULMONOLOGY | Facility: CLINIC | Age: 42
End: 2022-02-25

## 2022-03-29 ENCOUNTER — HOSPITAL ENCOUNTER (OUTPATIENT)
Age: 42
Discharge: HOME | End: 2022-03-29
Payer: COMMERCIAL

## 2022-03-29 VITALS
BODY MASS INDEX: 33.43 KG/M2 | HEART RATE: 102 BPM | WEIGHT: 208 LBS | TEMPERATURE: 98 F | SYSTOLIC BLOOD PRESSURE: 150 MMHG | HEIGHT: 66 IN | OXYGEN SATURATION: 95 % | DIASTOLIC BLOOD PRESSURE: 94 MMHG | RESPIRATION RATE: 16 BRPM

## 2022-03-29 DIAGNOSIS — J02.9 ACUTE VIRAL PHARYNGITIS: ICD-10-CM

## 2022-03-29 DIAGNOSIS — R03.0 ELEVATED BLOOD PRESSURE READING: ICD-10-CM

## 2022-03-29 DIAGNOSIS — H66.92 LEFT ACUTE OTITIS MEDIA: Primary | ICD-10-CM

## 2022-03-29 LAB — S PYO AG THROAT QL: NEGATIVE

## 2022-03-29 PROCEDURE — 87880 STREP A ASSAY W/OPTIC: CPT | Performed by: PHYSICIAN ASSISTANT

## 2022-03-29 PROCEDURE — 99213 OFFICE O/P EST LOW 20 MIN: CPT | Performed by: PHYSICIAN ASSISTANT

## 2022-03-29 RX ORDER — DOXYCYCLINE HYCLATE 100 MG/1
100 CAPSULE ORAL 2 TIMES DAILY
Qty: 14 CAPSULE | Refills: 0 | Status: SHIPPED | OUTPATIENT
Start: 2022-03-29 | End: 2022-04-05

## 2022-03-29 RX ORDER — IBUPROFEN 600 MG/1
TABLET ORAL
Qty: 20 TABLET | Refills: 0 | Status: SHIPPED | OUTPATIENT
Start: 2022-03-29

## 2022-03-29 NOTE — ED INITIAL ASSESSMENT (HPI)
Pt here w c/o L ear pain x 2.5 wks. State having pain radiating to his neck and 2 days ago started having sore throat. No fever.

## 2022-04-04 RX ORDER — ATORVASTATIN CALCIUM 10 MG/1
TABLET, FILM COATED ORAL
Qty: 30 TABLET | Refills: 2 | Status: SHIPPED | OUTPATIENT
Start: 2022-04-04

## 2022-04-04 RX ORDER — ESCITALOPRAM OXALATE 20 MG/1
TABLET ORAL
Qty: 30 TABLET | Refills: 5 | Status: SHIPPED | OUTPATIENT
Start: 2022-04-04

## 2022-04-04 NOTE — TELEPHONE ENCOUNTER
LOV: 1/25/2021  Last refill: 9/8/22, 1/3/2022    Dr. Renteria Ice review and sign pended prescription if agreeable.

## 2022-07-14 RX ORDER — ATORVASTATIN CALCIUM 10 MG/1
TABLET, FILM COATED ORAL
Qty: 30 TABLET | Refills: 2 | Status: SHIPPED | OUTPATIENT
Start: 2022-07-14

## 2022-07-14 NOTE — TELEPHONE ENCOUNTER
LOV: 1/25/2021  Last refill: 4/4/2022    Dr. Mehul Cerda review and sign pended prescription of agreeable.

## 2022-08-15 NOTE — TELEPHONE ENCOUNTER
LOV: upcoming 8/22/22  Last refill: 2/7/2022    Dr. Kaylee Gonzalez review and sign pended prescription for Alprazolam if agreeable.

## 2022-08-17 RX ORDER — ALPRAZOLAM 1 MG/1
TABLET ORAL
Qty: 30 TABLET | Refills: 0 | Status: SHIPPED | OUTPATIENT
Start: 2022-08-17 | End: 2022-08-22

## 2022-08-18 ENCOUNTER — LAB ENCOUNTER (OUTPATIENT)
Dept: LAB | Age: 42
End: 2022-08-18
Attending: INTERNAL MEDICINE
Payer: COMMERCIAL

## 2022-08-18 DIAGNOSIS — Z00.00 ENCOUNTER FOR PHYSICAL EXAMINATION: ICD-10-CM

## 2022-08-18 LAB
ALBUMIN SERPL-MCNC: 3.9 G/DL (ref 3.4–5)
ALBUMIN/GLOB SERPL: 1.2 {RATIO} (ref 1–2)
ALP LIVER SERPL-CCNC: 76 U/L
ALT SERPL-CCNC: 39 U/L
ANION GAP SERPL CALC-SCNC: 7 MMOL/L (ref 0–18)
AST SERPL-CCNC: 27 U/L (ref 15–37)
BASOPHILS # BLD AUTO: 0.1 X10(3) UL (ref 0–0.2)
BASOPHILS NFR BLD AUTO: 1.3 %
BILIRUB SERPL-MCNC: 0.5 MG/DL (ref 0.1–2)
BUN BLD-MCNC: 28 MG/DL (ref 7–18)
BUN/CREAT SERPL: 26.9 (ref 10–20)
CALCIUM BLD-MCNC: 9 MG/DL (ref 8.5–10.1)
CHLORIDE SERPL-SCNC: 107 MMOL/L (ref 98–112)
CHOLEST SERPL-MCNC: 176 MG/DL (ref ?–200)
CO2 SERPL-SCNC: 26 MMOL/L (ref 21–32)
CREAT BLD-MCNC: 1.04 MG/DL
DEPRECATED RDW RBC AUTO: 35.9 FL (ref 35.1–46.3)
EOSINOPHIL # BLD AUTO: 0.22 X10(3) UL (ref 0–0.7)
EOSINOPHIL NFR BLD AUTO: 2.8 %
ERYTHROCYTE [DISTWIDTH] IN BLOOD BY AUTOMATED COUNT: 11.8 % (ref 11–15)
EST. AVERAGE GLUCOSE BLD GHB EST-MCNC: 111 MG/DL (ref 68–126)
FASTING PATIENT LIPID ANSWER: YES
FASTING STATUS PATIENT QL REPORTED: YES
GFR SERPLBLD BASED ON 1.73 SQ M-ARVRAT: 93 ML/MIN/1.73M2 (ref 60–?)
GLOBULIN PLAS-MCNC: 3.3 G/DL (ref 2.8–4.4)
GLUCOSE BLD-MCNC: 107 MG/DL (ref 70–99)
HBA1C MFR BLD: 5.5 % (ref ?–5.7)
HCT VFR BLD AUTO: 42.1 %
HDLC SERPL-MCNC: 39 MG/DL (ref 40–59)
HGB BLD-MCNC: 14.4 G/DL
IMM GRANULOCYTES # BLD AUTO: 0.03 X10(3) UL (ref 0–1)
IMM GRANULOCYTES NFR BLD: 0.4 %
LDLC SERPL CALC-MCNC: 116 MG/DL (ref ?–100)
LYMPHOCYTES # BLD AUTO: 2.23 X10(3) UL (ref 1–4)
LYMPHOCYTES NFR BLD AUTO: 28.8 %
MCH RBC QN AUTO: 28.3 PG (ref 26–34)
MCHC RBC AUTO-ENTMCNC: 34.2 G/DL (ref 31–37)
MCV RBC AUTO: 82.9 FL
MONOCYTES # BLD AUTO: 0.61 X10(3) UL (ref 0.1–1)
MONOCYTES NFR BLD AUTO: 7.9 %
NEUTROPHILS # BLD AUTO: 4.56 X10 (3) UL (ref 1.5–7.7)
NEUTROPHILS # BLD AUTO: 4.56 X10(3) UL (ref 1.5–7.7)
NEUTROPHILS NFR BLD AUTO: 58.8 %
NONHDLC SERPL-MCNC: 137 MG/DL (ref ?–130)
OSMOLALITY SERPL CALC.SUM OF ELEC: 296 MOSM/KG (ref 275–295)
PLATELET # BLD AUTO: 281 10(3)UL (ref 150–450)
POTASSIUM SERPL-SCNC: 3.8 MMOL/L (ref 3.5–5.1)
PROT SERPL-MCNC: 7.2 G/DL (ref 6.4–8.2)
RBC # BLD AUTO: 5.08 X10(6)UL
SODIUM SERPL-SCNC: 140 MMOL/L (ref 136–145)
TRIGL SERPL-MCNC: 116 MG/DL (ref 30–149)
TSI SER-ACNC: 1.73 MIU/ML (ref 0.36–3.74)
VLDLC SERPL CALC-MCNC: 20 MG/DL (ref 0–30)
WBC # BLD AUTO: 7.8 X10(3) UL (ref 4–11)

## 2022-08-18 PROCEDURE — 85025 COMPLETE CBC W/AUTO DIFF WBC: CPT

## 2022-08-18 PROCEDURE — 84443 ASSAY THYROID STIM HORMONE: CPT

## 2022-08-18 PROCEDURE — 83036 HEMOGLOBIN GLYCOSYLATED A1C: CPT

## 2022-08-18 PROCEDURE — 80061 LIPID PANEL: CPT

## 2022-08-18 PROCEDURE — 36415 COLL VENOUS BLD VENIPUNCTURE: CPT

## 2022-08-18 PROCEDURE — 80053 COMPREHEN METABOLIC PANEL: CPT

## 2022-08-19 ENCOUNTER — PATIENT MESSAGE (OUTPATIENT)
Dept: PULMONOLOGY | Facility: CLINIC | Age: 42
End: 2022-08-19

## 2022-08-22 ENCOUNTER — TELEPHONE (OUTPATIENT)
Dept: PULMONOLOGY | Facility: CLINIC | Age: 42
End: 2022-08-22

## 2022-08-22 PROBLEM — F41.9 ANXIETY: Status: ACTIVE | Noted: 2022-08-22

## 2022-08-22 NOTE — TELEPHONE ENCOUNTER
----- [place] from [facility] sent at 8/19/2022  3:24 PM CDT -----  Regarding: Question regarding LIPID PANEL  I Need to RESCHEDULE my Monday follow up. I have to be in [place] by 1. I ALSO was wondering am I (DIABETIC). From my understanding my RESULTS looked good.

## 2022-08-22 NOTE — TELEPHONE ENCOUNTER
From: Claris Bence  To: Karen Lloyd MD  Sent: 8/19/2022 3:24 PM CDT  Subject: Question regarding LIPID PANEL    I Need to RESCHEDULE my Monday follow up. I have to be in [facility] by 1. I ALSO was wondering am I (DIABETIC). From my understanding my RESULTS looked good.

## 2022-10-10 RX ORDER — ATORVASTATIN CALCIUM 10 MG/1
TABLET, FILM COATED ORAL
Qty: 30 TABLET | Refills: 2 | Status: SHIPPED | OUTPATIENT
Start: 2022-10-10

## 2022-10-20 ENCOUNTER — APPOINTMENT (OUTPATIENT)
Dept: CT IMAGING | Facility: HOSPITAL | Age: 42
End: 2022-10-20
Attending: EMERGENCY MEDICINE
Payer: COMMERCIAL

## 2022-10-20 ENCOUNTER — HOSPITAL ENCOUNTER (INPATIENT)
Facility: HOSPITAL | Age: 42
LOS: 4 days | Discharge: HOME | End: 2022-10-25
Attending: EMERGENCY MEDICINE | Admitting: INTERNAL MEDICINE
Payer: COMMERCIAL

## 2022-10-20 DIAGNOSIS — K37 APPENDICITIS: ICD-10-CM

## 2022-10-20 DIAGNOSIS — K35.30 ACUTE APPENDICITIS WITH LOCALIZED PERITONITIS WITHOUT PERFORATION, UNSPECIFIED WHETHER ABSCESS PRESENT, UNSPECIFIED WHETHER GANGRENE PRESENT: Primary | ICD-10-CM

## 2022-10-20 LAB
ALBUMIN SERPL-MCNC: 3.9 G/DL (ref 3.4–5)
ALBUMIN/GLOB SERPL: 1 {RATIO} (ref 1–2)
ALP LIVER SERPL-CCNC: 89 U/L
ALT SERPL-CCNC: 37 U/L
ANION GAP SERPL CALC-SCNC: 8 MMOL/L (ref 0–18)
AST SERPL-CCNC: 26 U/L (ref 15–37)
BASOPHILS # BLD AUTO: 0.08 X10(3) UL (ref 0–0.2)
BASOPHILS NFR BLD AUTO: 0.5 %
BILIRUB SERPL-MCNC: 0.9 MG/DL (ref 0.1–2)
BILIRUB UR QL: NEGATIVE
BUN BLD-MCNC: 21 MG/DL (ref 7–18)
BUN/CREAT SERPL: 21.4 (ref 10–20)
CALCIUM BLD-MCNC: 8.6 MG/DL (ref 8.5–10.1)
CHLORIDE SERPL-SCNC: 104 MMOL/L (ref 98–112)
CLARITY UR: CLEAR
CO2 SERPL-SCNC: 27 MMOL/L (ref 21–32)
COLOR UR: YELLOW
CREAT BLD-MCNC: 0.98 MG/DL
DEPRECATED RDW RBC AUTO: 36.2 FL (ref 35.1–46.3)
EOSINOPHIL # BLD AUTO: 0.17 X10(3) UL (ref 0–0.7)
EOSINOPHIL NFR BLD AUTO: 1 %
ERYTHROCYTE [DISTWIDTH] IN BLOOD BY AUTOMATED COUNT: 12 % (ref 11–15)
GFR SERPLBLD BASED ON 1.73 SQ M-ARVRAT: 100 ML/MIN/1.73M2 (ref 60–?)
GLOBULIN PLAS-MCNC: 3.8 G/DL (ref 2.8–4.4)
GLUCOSE BLD-MCNC: 122 MG/DL (ref 70–99)
GLUCOSE UR-MCNC: NEGATIVE MG/DL
HCT VFR BLD AUTO: 45 %
HGB BLD-MCNC: 15.4 G/DL
HGB UR QL STRIP.AUTO: NEGATIVE
IMM GRANULOCYTES # BLD AUTO: 0.07 X10(3) UL (ref 0–1)
IMM GRANULOCYTES NFR BLD: 0.4 %
KETONES UR-MCNC: NEGATIVE MG/DL
LEUKOCYTE ESTERASE UR QL STRIP.AUTO: NEGATIVE
LIPASE SERPL-CCNC: 123 U/L (ref 73–393)
LYMPHOCYTES # BLD AUTO: 1.39 X10(3) UL (ref 1–4)
LYMPHOCYTES NFR BLD AUTO: 8.2 %
MCH RBC QN AUTO: 28.4 PG (ref 26–34)
MCHC RBC AUTO-ENTMCNC: 34.2 G/DL (ref 31–37)
MCV RBC AUTO: 83 FL
MONOCYTES # BLD AUTO: 1.09 X10(3) UL (ref 0.1–1)
MONOCYTES NFR BLD AUTO: 6.4 %
NEUTROPHILS # BLD AUTO: 14.1 X10 (3) UL (ref 1.5–7.7)
NEUTROPHILS # BLD AUTO: 14.1 X10(3) UL (ref 1.5–7.7)
NEUTROPHILS NFR BLD AUTO: 83.5 %
NITRITE UR QL STRIP.AUTO: NEGATIVE
OSMOLALITY SERPL CALC.SUM OF ELEC: 292 MOSM/KG (ref 275–295)
PH UR: 6 [PH] (ref 5–8)
PLATELET # BLD AUTO: 268 10(3)UL (ref 150–450)
POTASSIUM SERPL-SCNC: 3.8 MMOL/L (ref 3.5–5.1)
PROT SERPL-MCNC: 7.7 G/DL (ref 6.4–8.2)
PROT UR-MCNC: 30 MG/DL
RBC # BLD AUTO: 5.42 X10(6)UL
SODIUM SERPL-SCNC: 139 MMOL/L (ref 136–145)
SP GR UR STRIP: 1.03 (ref 1–1.03)
UROBILINOGEN UR STRIP-ACNC: 2
VIT C UR-MCNC: NEGATIVE MG/DL
WBC # BLD AUTO: 16.9 X10(3) UL (ref 4–11)

## 2022-10-20 PROCEDURE — 74177 CT ABD & PELVIS W/CONTRAST: CPT | Performed by: EMERGENCY MEDICINE

## 2022-10-20 RX ORDER — KETOROLAC TROMETHAMINE 15 MG/ML
15 INJECTION, SOLUTION INTRAMUSCULAR; INTRAVENOUS ONCE
Status: COMPLETED | OUTPATIENT
Start: 2022-10-20 | End: 2022-10-20

## 2022-10-20 RX ORDER — ONDANSETRON 4 MG/1
TABLET, ORALLY DISINTEGRATING ORAL
Status: DISPENSED
Start: 2022-10-20 | End: 2022-10-21

## 2022-10-20 RX ORDER — ONDANSETRON 4 MG/1
4 TABLET, ORALLY DISINTEGRATING ORAL ONCE
Status: COMPLETED | OUTPATIENT
Start: 2022-10-20 | End: 2022-10-20

## 2022-10-20 RX ORDER — MORPHINE SULFATE 4 MG/ML
4 INJECTION, SOLUTION INTRAMUSCULAR; INTRAVENOUS ONCE
Status: COMPLETED | OUTPATIENT
Start: 2022-10-20 | End: 2022-10-20

## 2022-10-20 RX ORDER — METOCLOPRAMIDE HYDROCHLORIDE 5 MG/ML
5 INJECTION INTRAMUSCULAR; INTRAVENOUS ONCE
Status: COMPLETED | OUTPATIENT
Start: 2022-10-20 | End: 2022-10-20

## 2022-10-20 RX ORDER — SODIUM CHLORIDE, SODIUM LACTATE, POTASSIUM CHLORIDE, CALCIUM CHLORIDE 600; 310; 30; 20 MG/100ML; MG/100ML; MG/100ML; MG/100ML
INJECTION, SOLUTION INTRAVENOUS ONCE
Status: COMPLETED | OUTPATIENT
Start: 2022-10-20 | End: 2022-10-21

## 2022-10-21 ENCOUNTER — ANESTHESIA (OUTPATIENT)
Dept: SURGERY | Facility: HOSPITAL | Age: 42
End: 2022-10-21
Payer: COMMERCIAL

## 2022-10-21 ENCOUNTER — ANESTHESIA EVENT (OUTPATIENT)
Dept: SURGERY | Facility: HOSPITAL | Age: 42
End: 2022-10-21
Payer: COMMERCIAL

## 2022-10-21 ENCOUNTER — APPOINTMENT (OUTPATIENT)
Dept: GENERAL RADIOLOGY | Facility: HOSPITAL | Age: 42
End: 2022-10-21
Attending: ANESTHESIOLOGY
Payer: COMMERCIAL

## 2022-10-21 DIAGNOSIS — K37 APPENDICITIS: Primary | ICD-10-CM

## 2022-10-21 PROBLEM — K35.30: Status: ACTIVE | Noted: 2022-10-21

## 2022-10-21 LAB — SARS-COV-2 RNA RESP QL NAA+PROBE: NOT DETECTED

## 2022-10-21 PROCEDURE — 0DTJ4ZZ RESECTION OF APPENDIX, PERCUTANEOUS ENDOSCOPIC APPROACH: ICD-10-PCS | Performed by: SURGERY

## 2022-10-21 PROCEDURE — 44970 LAPAROSCOPY APPENDECTOMY: CPT | Performed by: SURGERY

## 2022-10-21 PROCEDURE — 99254 IP/OBS CNSLTJ NEW/EST MOD 60: CPT | Performed by: SURGERY

## 2022-10-21 PROCEDURE — 71045 X-RAY EXAM CHEST 1 VIEW: CPT | Performed by: ANESTHESIOLOGY

## 2022-10-21 PROCEDURE — 99223 1ST HOSP IP/OBS HIGH 75: CPT | Performed by: INTERNAL MEDICINE

## 2022-10-21 RX ORDER — ACETAMINOPHEN 325 MG/1
650 TABLET ORAL EVERY 6 HOURS PRN
Status: DISCONTINUED | OUTPATIENT
Start: 2022-10-21 | End: 2022-10-25

## 2022-10-21 RX ORDER — MORPHINE SULFATE 2 MG/ML
2 INJECTION, SOLUTION INTRAMUSCULAR; INTRAVENOUS EVERY 4 HOURS PRN
Status: DISCONTINUED | OUTPATIENT
Start: 2022-10-21 | End: 2022-10-21

## 2022-10-21 RX ORDER — MORPHINE SULFATE 4 MG/ML
4 INJECTION, SOLUTION INTRAMUSCULAR; INTRAVENOUS EVERY 10 MIN PRN
Status: DISCONTINUED | OUTPATIENT
Start: 2022-10-21 | End: 2022-10-21 | Stop reason: HOSPADM

## 2022-10-21 RX ORDER — DEXAMETHASONE SODIUM PHOSPHATE 4 MG/ML
VIAL (ML) INJECTION AS NEEDED
Status: DISCONTINUED | OUTPATIENT
Start: 2022-10-21 | End: 2022-10-21 | Stop reason: SURG

## 2022-10-21 RX ORDER — SODIUM CHLORIDE, SODIUM LACTATE, POTASSIUM CHLORIDE, CALCIUM CHLORIDE 600; 310; 30; 20 MG/100ML; MG/100ML; MG/100ML; MG/100ML
INJECTION, SOLUTION INTRAVENOUS CONTINUOUS
Status: DISCONTINUED | OUTPATIENT
Start: 2022-10-21 | End: 2022-10-21 | Stop reason: HOSPADM

## 2022-10-21 RX ORDER — SODIUM CHLORIDE 9 MG/ML
INJECTION, SOLUTION INTRAVENOUS CONTINUOUS
Status: DISCONTINUED | OUTPATIENT
Start: 2022-10-21 | End: 2022-10-24

## 2022-10-21 RX ORDER — HYDROMORPHONE HYDROCHLORIDE 1 MG/ML
0.4 INJECTION, SOLUTION INTRAMUSCULAR; INTRAVENOUS; SUBCUTANEOUS EVERY 2 HOUR PRN
Status: DISCONTINUED | OUTPATIENT
Start: 2022-10-21 | End: 2022-10-25

## 2022-10-21 RX ORDER — ONDANSETRON 2 MG/ML
INJECTION INTRAMUSCULAR; INTRAVENOUS AS NEEDED
Status: DISCONTINUED | OUTPATIENT
Start: 2022-10-21 | End: 2022-10-21 | Stop reason: SURG

## 2022-10-21 RX ORDER — HEPARIN SODIUM 5000 [USP'U]/ML
5000 INJECTION, SOLUTION INTRAVENOUS; SUBCUTANEOUS EVERY 12 HOURS SCHEDULED
Status: DISCONTINUED | OUTPATIENT
Start: 2022-10-21 | End: 2022-10-25

## 2022-10-21 RX ORDER — POLYETHYLENE GLYCOL 3350 17 G/17G
17 POWDER, FOR SOLUTION ORAL DAILY PRN
Status: DISCONTINUED | OUTPATIENT
Start: 2022-10-21 | End: 2022-10-25

## 2022-10-21 RX ORDER — HYDROMORPHONE HYDROCHLORIDE 1 MG/ML
0.2 INJECTION, SOLUTION INTRAMUSCULAR; INTRAVENOUS; SUBCUTANEOUS EVERY 5 MIN PRN
Status: DISCONTINUED | OUTPATIENT
Start: 2022-10-21 | End: 2022-10-21 | Stop reason: HOSPADM

## 2022-10-21 RX ORDER — NALOXONE HYDROCHLORIDE 0.4 MG/ML
80 INJECTION, SOLUTION INTRAMUSCULAR; INTRAVENOUS; SUBCUTANEOUS AS NEEDED
Status: DISCONTINUED | OUTPATIENT
Start: 2022-10-21 | End: 2022-10-21 | Stop reason: HOSPADM

## 2022-10-21 RX ORDER — SODIUM PHOSPHATE, DIBASIC AND SODIUM PHOSPHATE, MONOBASIC 7; 19 G/133ML; G/133ML
1 ENEMA RECTAL ONCE AS NEEDED
Status: DISCONTINUED | OUTPATIENT
Start: 2022-10-21 | End: 2022-10-25

## 2022-10-21 RX ORDER — HYDROMORPHONE HYDROCHLORIDE 1 MG/ML
0.8 INJECTION, SOLUTION INTRAMUSCULAR; INTRAVENOUS; SUBCUTANEOUS EVERY 2 HOUR PRN
Status: DISCONTINUED | OUTPATIENT
Start: 2022-10-21 | End: 2022-10-25

## 2022-10-21 RX ORDER — OXYCODONE HYDROCHLORIDE 5 MG/1
5 TABLET ORAL EVERY 4 HOURS PRN
Status: DISCONTINUED | OUTPATIENT
Start: 2022-10-21 | End: 2022-10-25

## 2022-10-21 RX ORDER — KETOROLAC TROMETHAMINE 30 MG/ML
30 INJECTION, SOLUTION INTRAMUSCULAR; INTRAVENOUS EVERY 6 HOURS
Status: COMPLETED | OUTPATIENT
Start: 2022-10-21 | End: 2022-10-23

## 2022-10-21 RX ORDER — MORPHINE SULFATE 10 MG/ML
6 INJECTION, SOLUTION INTRAMUSCULAR; INTRAVENOUS EVERY 10 MIN PRN
Status: DISCONTINUED | OUTPATIENT
Start: 2022-10-21 | End: 2022-10-21 | Stop reason: HOSPADM

## 2022-10-21 RX ORDER — LIDOCAINE HYDROCHLORIDE 10 MG/ML
INJECTION, SOLUTION EPIDURAL; INFILTRATION; INTRACAUDAL; PERINEURAL AS NEEDED
Status: DISCONTINUED | OUTPATIENT
Start: 2022-10-21 | End: 2022-10-21 | Stop reason: SURG

## 2022-10-21 RX ORDER — OXYCODONE HYDROCHLORIDE 5 MG/1
10 TABLET ORAL EVERY 4 HOURS PRN
Status: DISCONTINUED | OUTPATIENT
Start: 2022-10-21 | End: 2022-10-25

## 2022-10-21 RX ORDER — BISACODYL 10 MG
10 SUPPOSITORY, RECTAL RECTAL
Status: DISCONTINUED | OUTPATIENT
Start: 2022-10-21 | End: 2022-10-25

## 2022-10-21 RX ORDER — MORPHINE SULFATE 2 MG/ML
2 INJECTION, SOLUTION INTRAMUSCULAR; INTRAVENOUS EVERY 2 HOUR PRN
Status: DISCONTINUED | OUTPATIENT
Start: 2022-10-21 | End: 2022-10-22 | Stop reason: ALTCHOICE

## 2022-10-21 RX ORDER — SENNOSIDES 8.6 MG
17.2 TABLET ORAL NIGHTLY PRN
Status: DISCONTINUED | OUTPATIENT
Start: 2022-10-21 | End: 2022-10-25

## 2022-10-21 RX ORDER — ONDANSETRON 2 MG/ML
4 INJECTION INTRAMUSCULAR; INTRAVENOUS EVERY 6 HOURS PRN
Status: DISCONTINUED | OUTPATIENT
Start: 2022-10-21 | End: 2022-10-25

## 2022-10-21 RX ORDER — ONDANSETRON 2 MG/ML
4 INJECTION INTRAMUSCULAR; INTRAVENOUS EVERY 6 HOURS PRN
Status: DISCONTINUED | OUTPATIENT
Start: 2022-10-21 | End: 2022-10-22 | Stop reason: ALTCHOICE

## 2022-10-21 RX ORDER — PROCHLORPERAZINE EDISYLATE 5 MG/ML
5 INJECTION INTRAMUSCULAR; INTRAVENOUS EVERY 8 HOURS PRN
Status: DISCONTINUED | OUTPATIENT
Start: 2022-10-21 | End: 2022-10-25

## 2022-10-21 RX ORDER — BUPIVACAINE HYDROCHLORIDE 5 MG/ML
INJECTION, SOLUTION EPIDURAL; INTRACAUDAL AS NEEDED
Status: DISCONTINUED | OUTPATIENT
Start: 2022-10-21 | End: 2022-10-21 | Stop reason: HOSPADM

## 2022-10-21 RX ORDER — HYDROMORPHONE HYDROCHLORIDE 1 MG/ML
0.4 INJECTION, SOLUTION INTRAMUSCULAR; INTRAVENOUS; SUBCUTANEOUS EVERY 5 MIN PRN
Status: DISCONTINUED | OUTPATIENT
Start: 2022-10-21 | End: 2022-10-21 | Stop reason: HOSPADM

## 2022-10-21 RX ORDER — HYDROMORPHONE HYDROCHLORIDE 1 MG/ML
0.6 INJECTION, SOLUTION INTRAMUSCULAR; INTRAVENOUS; SUBCUTANEOUS EVERY 5 MIN PRN
Status: DISCONTINUED | OUTPATIENT
Start: 2022-10-21 | End: 2022-10-21 | Stop reason: HOSPADM

## 2022-10-21 RX ORDER — DIPHENHYDRAMINE HCL 25 MG
25 CAPSULE ORAL EVERY 6 HOURS PRN
Status: DISCONTINUED | OUTPATIENT
Start: 2022-10-21 | End: 2022-10-25

## 2022-10-21 RX ORDER — MORPHINE SULFATE 4 MG/ML
2 INJECTION, SOLUTION INTRAMUSCULAR; INTRAVENOUS EVERY 10 MIN PRN
Status: DISCONTINUED | OUTPATIENT
Start: 2022-10-21 | End: 2022-10-21 | Stop reason: HOSPADM

## 2022-10-21 RX ORDER — FUROSEMIDE 10 MG/ML
20 INJECTION INTRAMUSCULAR; INTRAVENOUS
Status: DISCONTINUED | OUTPATIENT
Start: 2022-10-21 | End: 2022-10-25

## 2022-10-21 RX ORDER — ROCURONIUM BROMIDE 10 MG/ML
INJECTION, SOLUTION INTRAVENOUS AS NEEDED
Status: DISCONTINUED | OUTPATIENT
Start: 2022-10-21 | End: 2022-10-21 | Stop reason: SURG

## 2022-10-21 RX ADMIN — ROCURONIUM BROMIDE 50 MG: 10 INJECTION, SOLUTION INTRAVENOUS at 13:10:00

## 2022-10-21 RX ADMIN — ONDANSETRON 4 MG: 2 INJECTION INTRAMUSCULAR; INTRAVENOUS at 13:09:00

## 2022-10-21 RX ADMIN — SODIUM CHLORIDE: 9 INJECTION, SOLUTION INTRAVENOUS at 13:04:00

## 2022-10-21 RX ADMIN — LIDOCAINE HYDROCHLORIDE 50 MG: 10 INJECTION, SOLUTION EPIDURAL; INFILTRATION; INTRACAUDAL; PERINEURAL at 13:04:00

## 2022-10-21 RX ADMIN — DEXAMETHASONE SODIUM PHOSPHATE 4 MG: 4 MG/ML VIAL (ML) INJECTION at 13:09:00

## 2022-10-21 NOTE — ED QUICK NOTES
Orders for admission, patient is aware of plan and ready to go upstairs. Any questions, please call ED RN Elliott at extension [phone].      Patient Covid vaccination status: Fully vaccinated     COVID Test Ordered in ED: None    COVID Suspicion at Admission: N/A    Running Infusions:      Mental Status/LOC at time of transport: A&Ox3    Other pertinent information: up and ambulatory   CIWA score: N/A   NIH score:  N/A

## 2022-10-21 NOTE — PACU NOTE
NOTIFIED DR. BRYANT OF TRIAL OF NASAL CANNULA; O2 SAT DOWN TO 89-90% ON 6L/MIN; LUNGS REMAIN VERY DIMINISHED ESPECIALLY @ BASES; NRM PUT BACK ON W/ INCREASE IN O2 SAT TO MID 90'S. DR. Roberto Erazo ORDERED CXR PORT & PT. TO GO TO PCCU, NOT THE FLOOR.

## 2022-10-21 NOTE — ANESTHESIA PROCEDURE NOTES
Airway  Date/Time: 10/21/2022 1:05 PM  Urgency: Elective    Airway not difficult    General Information and Staff    Patient location during procedure: OR  Anesthesiologist: Luis Storey MD  Performed: anesthesiologist     Indications and Patient Condition  Indications for airway management: anesthesia  Sedation level: deep  Preoxygenated: yes  Patient position: sniffing  Mask difficulty assessment: 1 - vent by mask    Final Airway Details  Final airway type: endotracheal airway      Successful airway: ETT  Cuffed: yes   Successful intubation technique: direct laryngoscopy  Facilitating devices/methods: cricoid pressure  Endotracheal tube insertion site: oral  Blade: Volodymyr  Blade size: #4    Cormack-Lehane Classification: grade I - full view of glottis  Placement verified by: chest auscultation and capnometry   Measured from: gums  ETT to gums (cm): 20  Number of attempts at approach: 1    Additional Comments  Atx x 1

## 2022-10-21 NOTE — ED QUICK NOTES
Pt ambulatory to room brisk steady gait with c/o of RLQ pain that started this morning with nausea, and vomiting, denies dysuria, chills,fever,cp, sob, pt changed into gown, oriented to room, placed on monitor, call light within reach. Family at bedside, iv established.

## 2022-10-21 NOTE — PACU NOTE
PT. ARRIVED IN PACU SOMNOLENT, WITH SONOROUS BREATH SOUNDS; LUNGS: VERY DIMINISHED @ BASES, SONOROUS, VIBRATORY SOUNDING; WAS ON NASAL CANNULA BUT CHANGED TO NRM 12L/MIN. DR. Jennifer Fishman INSERTED NASAL TRUMPET #30 W/OUT PROBLEM VIA RT. NARE. SONOROUS RESPIRATIONS STOPPED AFTERWARDS; O2 SAT UP FROM 89-91% TO 96-97%; PT. REMAINA ASLEEP, NOT MINDING NASAL TRUMPET. LUNGS W/OUT SONOROUS SOUNDS AFTER AIRWAY PUT IN.

## 2022-10-21 NOTE — ED QUICK NOTES
Pt to and from ct in stable condition, reports pain and nausea has improved, call light within reach, no distress noted

## 2022-10-21 NOTE — PLAN OF CARE
Problem: Patient Centered Care  Goal: Patient preferences are identified and integrated in the patient's plan of care  Description: Interventions:  - What would you like us to know as we care for you?   - Provide timely, complete, and accurate information to patient/family  - Incorporate patient and family knowledge, values, beliefs, and cultural backgrounds into the planning and delivery of care  - Encourage patient/family to participate in care and decision-making at the level they choose  - Honor patient and family perspectives and choices  Outcome: Progressing     Problem: Patient/Family Goals  Goal: Patient/Family Long Term Goal  Description: Patient's Long Term Goal:     Interventions:  -   - See additional Care Plan goals for specific interventions  Outcome: Progressing  Goal: Patient/Family Short Term Goal  Description: Patient's Short Term Goal:     Interventions:   -   - See additional Care Plan goals for specific interventions  Outcome: Progressing     Problem: GASTROINTESTINAL - ADULT  Goal: Minimal or absence of nausea and vomiting  Description: INTERVENTIONS:  - Maintain adequate hydration with IV or PO as ordered and tolerated  - Nasogastric tube to low intermittent suction as ordered  - Evaluate effectiveness of ordered antiemetic medications  - Provide nonpharmacologic comfort measures as appropriate  - Advance diet as tolerated, if ordered  - Obtain nutritional consult as needed  - Evaluate fluid balance  Outcome: Progressing  Goal: Maintains or returns to baseline bowel function  Description: INTERVENTIONS:  - Assess bowel function  - Maintain adequate hydration with IV or PO as ordered and tolerated  - Evaluate effectiveness of GI medications  - Encourage mobilization and activity  - Obtain nutritional consult as needed  - Establish a toileting routine/schedule  - Consider collaborating with pharmacy to review patient's medication profile  Outcome: Progressing     Problem: SKIN/TISSUE INTEGRITY - ADULT  Goal: Skin integrity remains intact  Description: INTERVENTIONS  - Assess and document risk factors for pressure ulcer development  - Assess and document skin integrity  - Monitor for areas of redness and/or skin breakdown  - Initiate interventions, skin care algorithm/standards of care as needed  Outcome: Progressing  Goal: Incision(s), wounds(s) or drain site(s) healing without S/S of infection  Description: INTERVENTIONS:  - Assess and document risk factors for pressure ulcer development  - Assess and document skin integrity  - Assess and document dressing/incision, wound bed, drain sites and surrounding tissue  - Implement wound care per orders  - Initiate isolation precautions as appropriate  - Initiate Pressure Ulcer prevention bundle as indicated  Outcome: Progressing     Problem: PAIN - ADULT  Goal: Verbalizes/displays adequate comfort level or patient's stated pain goal  Description: INTERVENTIONS:  - Encourage pt to monitor pain and request assistance  - Assess pain using appropriate pain scale  - Administer analgesics based on type and severity of pain and evaluate response  - Implement non-pharmacological measures as appropriate and evaluate response  - Consider cultural and social influences on pain and pain management  - Manage/alleviate anxiety  - Utilize distraction and/or relaxation techniques  - Monitor for opioid side effects  - Notify MD/LIP if interventions unsuccessful or patient reports new pain  - Anticipate increased pain with activity and pre-medicate as appropriate  Outcome: Progressing     Problem: RISK FOR INFECTION - ADULT  Goal: Absence of fever/infection during anticipated neutropenic period  Description: INTERVENTIONS  - Monitor WBC  - Administer growth factors as ordered  - Implement neutropenic guidelines  Outcome: Progressing     Problem: SAFETY ADULT - FALL  Goal: Free from fall injury  Description: INTERVENTIONS:  - Assess pt frequently for physical needs  - Identify cognitive and physical deficits and behaviors that affect risk of falls.   - [place] fall precautions as indicated by assessment.  - Educate pt/family on patient safety including physical limitations  - Instruct pt to call for assistance with activity based on assessment  - Modify environment to reduce risk of injury  - Provide assistive devices as appropriate  - Consider OT/PT consult to assist with strengthening/mobility  - Encourage toileting schedule  Outcome: Progressing     Problem: DISCHARGE PLANNING  Goal: Discharge to home or other facility with appropriate resources  Description: INTERVENTIONS:  - Identify barriers to discharge w/pt and caregiver  - Include patient/family/discharge partner in discharge planning  - Arrange for needed discharge resources and transportation as appropriate  - Identify discharge learning needs (meds, wound care, etc)  - Arrange for interpreters to assist at discharge as needed  - Consider post-discharge preferences of patient/family/discharge partner  - Complete POLST form as appropriate  - Assess patient's ability to be responsible for managing their own health  - Refer to Case Management Department for coordinating discharge planning if the patient needs post-hospital services based on physician/LIP order or complex needs related to functional status, cognitive ability or social support system  Outcome: Progressing

## 2022-10-21 NOTE — ED INITIAL ASSESSMENT (HPI)
LLQ abd pain radiates into groin, pain started today along with nausea. Denied pain with urination. Took ibuprofen and tylenol pta with no relief.

## 2022-10-21 NOTE — OPERATIVE REPORT
Operative Report    Patient Name:  Joel Porras  MR:  [id number]  :  1980  DOS:  10/21/22    Preop Dx:  Appendicitis Sherryrd Alvarezer  Postop Dx:  Appendicitis [K37]  Procedure:  Laparoscopic appendectomy  Surgeon:  Erlin Rodrigues MD  Assistant:  Isabel Cunningham MD  EBL: Blood Output: 10 mL (10/21/2022  5:36 PM)    Complication:  None    INDICATION:  Pt is a 38year old male who with Appendicitis Sherry Juan who is scheduled for a LAPAROSCOPIC APPENDECTOMY. CONSENT:  An informed consent discussion was held with the patient regarding the nature of acute appendicitis, the treatment options and the details of the procedure. The risks including but not limited to bleeding, wound infection, intra-abdominal infection, injury to the colon, small intestine, right ureter, incomplete resection, and incisional hernia were discussed. The patient expressed understanding and want to proceed with the planned procedure. TECHNIQUE:  The patient was taken to the OR and placed in supine position. General anesthesia was established and the abdomen was prepped in standard fashion. Pneumoperitoneum was obtained using open technique through a supra-umbilical incision. A 12-mm trocar was inserted under direct visualization and no injury occurred. Examination of the abdomen showed fibrinopurulent material in the RLQ and pelvis. Two 5-mm trocars were placed in the LLQ and supra-pubic area. The patient was placed in Trendelenburg position. We bluntly  the small bowel adhesions to the RLQ and saw a gangrenous appendix. Small amount of fibrinopurulent material was seen nearby. I aspirated the free infected fluid to minimize contamination of the peritoneal cavity. The cecum and base of the appendix was freed from the retroperitoneum. I had to place an additional 5-mm trocar in the right abdomen to assist in retraction. The dissection was difficult due to the inflammation and fibrosis thus warrants a modifier 22.   The mesoappendix was divided using the Enseal.  The base of the appendix was divided using a 45 x 3.5 mm linear stapler. The appendix was delivered from the abdomen using an endocatch bag. The abdomen was irrigated with saline and found to be hemostatic. The staple line was hemostatic. A 10 CARINE drain was placed in the RLQ. The ports were withdrawn under direct visualization and no port site bleeding was seen. The supra-umbilical fascia was closed using 0 vicryl. The skin incisions were closed using 4-0 vicryl. Sterile dressings were applied. All instrument and sponge counts were correct. I was present during the critical portions of the procedure.         Miles Conroy MD

## 2022-10-22 ENCOUNTER — APPOINTMENT (OUTPATIENT)
Dept: GENERAL RADIOLOGY | Facility: HOSPITAL | Age: 42
End: 2022-10-22
Attending: INTERNAL MEDICINE
Payer: COMMERCIAL

## 2022-10-22 LAB
ANION GAP SERPL CALC-SCNC: 7 MMOL/L (ref 0–18)
BASOPHILS # BLD AUTO: 0.01 X10(3) UL (ref 0–0.2)
BASOPHILS NFR BLD AUTO: 0.1 %
BUN BLD-MCNC: 18 MG/DL (ref 7–18)
BUN/CREAT SERPL: 17.1 (ref 10–20)
CALCIUM BLD-MCNC: 8 MG/DL (ref 8.5–10.1)
CHLORIDE SERPL-SCNC: 104 MMOL/L (ref 98–112)
CO2 SERPL-SCNC: 31 MMOL/L (ref 21–32)
CREAT BLD-MCNC: 1.05 MG/DL
DEPRECATED RDW RBC AUTO: 39.8 FL (ref 35.1–46.3)
EOSINOPHIL # BLD AUTO: 0.04 X10(3) UL (ref 0–0.7)
EOSINOPHIL NFR BLD AUTO: 0.3 %
ERYTHROCYTE [DISTWIDTH] IN BLOOD BY AUTOMATED COUNT: 12.2 % (ref 11–15)
GFR SERPLBLD BASED ON 1.73 SQ M-ARVRAT: 92 ML/MIN/1.73M2 (ref 60–?)
GLUCOSE BLD-MCNC: 126 MG/DL (ref 70–99)
HCT VFR BLD AUTO: 43.2 %
HGB BLD-MCNC: 13.9 G/DL
IMM GRANULOCYTES # BLD AUTO: 0.06 X10(3) UL (ref 0–1)
IMM GRANULOCYTES NFR BLD: 0.5 %
LYMPHOCYTES # BLD AUTO: 1.3 X10(3) UL (ref 1–4)
LYMPHOCYTES NFR BLD AUTO: 10.8 %
MCH RBC QN AUTO: 28.2 PG (ref 26–34)
MCHC RBC AUTO-ENTMCNC: 32.2 G/DL (ref 31–37)
MCV RBC AUTO: 87.6 FL
MONOCYTES # BLD AUTO: 0.76 X10(3) UL (ref 0.1–1)
MONOCYTES NFR BLD AUTO: 6.3 %
NEUTROPHILS # BLD AUTO: 9.9 X10 (3) UL (ref 1.5–7.7)
NEUTROPHILS # BLD AUTO: 9.9 X10(3) UL (ref 1.5–7.7)
NEUTROPHILS NFR BLD AUTO: 82 %
OSMOLALITY SERPL CALC.SUM OF ELEC: 297 MOSM/KG (ref 275–295)
PLATELET # BLD AUTO: 246 10(3)UL (ref 150–450)
POTASSIUM SERPL-SCNC: 4.2 MMOL/L (ref 3.5–5.1)
RBC # BLD AUTO: 4.93 X10(6)UL
SODIUM SERPL-SCNC: 142 MMOL/L (ref 136–145)
WBC # BLD AUTO: 12.1 X10(3) UL (ref 4–11)

## 2022-10-22 PROCEDURE — 99233 SBSQ HOSP IP/OBS HIGH 50: CPT | Performed by: INTERNAL MEDICINE

## 2022-10-22 PROCEDURE — 71045 X-RAY EXAM CHEST 1 VIEW: CPT | Performed by: INTERNAL MEDICINE

## 2022-10-22 PROCEDURE — 99024 POSTOP FOLLOW-UP VISIT: CPT | Performed by: SURGERY

## 2022-10-22 NOTE — PROGRESS NOTES
Created a new POA for Healthcare document that, per the patient, accurately reflects their wishes. Gave the patient the original POA document along with copies. A copy of the new POA document has been placed on the patient's paper chart. Spiritual care can be requested via an Epic consult.    ronni Medinalain

## 2022-10-23 LAB
ANION GAP SERPL CALC-SCNC: 4 MMOL/L (ref 0–18)
BASOPHILS # BLD AUTO: 0.05 X10(3) UL (ref 0–0.2)
BASOPHILS NFR BLD AUTO: 0.6 %
BUN BLD-MCNC: 18 MG/DL (ref 7–18)
BUN/CREAT SERPL: 20 (ref 10–20)
CALCIUM BLD-MCNC: 8 MG/DL (ref 8.5–10.1)
CHLORIDE SERPL-SCNC: 104 MMOL/L (ref 98–112)
CO2 SERPL-SCNC: 33 MMOL/L (ref 21–32)
CREAT BLD-MCNC: 0.9 MG/DL
DEPRECATED RDW RBC AUTO: 38.5 FL (ref 35.1–46.3)
EOSINOPHIL # BLD AUTO: 0.21 X10(3) UL (ref 0–0.7)
EOSINOPHIL NFR BLD AUTO: 2.4 %
ERYTHROCYTE [DISTWIDTH] IN BLOOD BY AUTOMATED COUNT: 12 % (ref 11–15)
GFR SERPLBLD BASED ON 1.73 SQ M-ARVRAT: 111 ML/MIN/1.73M2 (ref 60–?)
GLUCOSE BLD-MCNC: 109 MG/DL (ref 70–99)
HCT VFR BLD AUTO: 40.5 %
HGB BLD-MCNC: 13.3 G/DL
IMM GRANULOCYTES # BLD AUTO: 0.03 X10(3) UL (ref 0–1)
IMM GRANULOCYTES NFR BLD: 0.3 %
LYMPHOCYTES # BLD AUTO: 1.47 X10(3) UL (ref 1–4)
LYMPHOCYTES NFR BLD AUTO: 16.6 %
MCH RBC QN AUTO: 28.6 PG (ref 26–34)
MCHC RBC AUTO-ENTMCNC: 32.8 G/DL (ref 31–37)
MCV RBC AUTO: 87.1 FL
MONOCYTES # BLD AUTO: 0.94 X10(3) UL (ref 0.1–1)
MONOCYTES NFR BLD AUTO: 10.6 %
NEUTROPHILS # BLD AUTO: 6.13 X10 (3) UL (ref 1.5–7.7)
NEUTROPHILS # BLD AUTO: 6.13 X10(3) UL (ref 1.5–7.7)
NEUTROPHILS NFR BLD AUTO: 69.5 %
OSMOLALITY SERPL CALC.SUM OF ELEC: 294 MOSM/KG (ref 275–295)
PLATELET # BLD AUTO: 219 10(3)UL (ref 150–450)
POTASSIUM SERPL-SCNC: 4.1 MMOL/L (ref 3.5–5.1)
RBC # BLD AUTO: 4.65 X10(6)UL
SODIUM SERPL-SCNC: 141 MMOL/L (ref 136–145)
WBC # BLD AUTO: 8.8 X10(3) UL (ref 4–11)

## 2022-10-23 PROCEDURE — 99233 SBSQ HOSP IP/OBS HIGH 50: CPT | Performed by: INTERNAL MEDICINE

## 2022-10-24 LAB
ANION GAP SERPL CALC-SCNC: 5 MMOL/L (ref 0–18)
BASOPHILS # BLD AUTO: 0.07 X10(3) UL (ref 0–0.2)
BASOPHILS NFR BLD AUTO: 1 %
BUN BLD-MCNC: 19 MG/DL (ref 7–18)
BUN/CREAT SERPL: 23.8 (ref 10–20)
CALCIUM BLD-MCNC: 8.1 MG/DL (ref 8.5–10.1)
CHLORIDE SERPL-SCNC: 104 MMOL/L (ref 98–112)
CO2 SERPL-SCNC: 31 MMOL/L (ref 21–32)
CREAT BLD-MCNC: 0.8 MG/DL
DEPRECATED RDW RBC AUTO: 35.8 FL (ref 35.1–46.3)
EOSINOPHIL # BLD AUTO: 0.3 X10(3) UL (ref 0–0.7)
EOSINOPHIL NFR BLD AUTO: 4.4 %
ERYTHROCYTE [DISTWIDTH] IN BLOOD BY AUTOMATED COUNT: 11.7 % (ref 11–15)
GFR SERPLBLD BASED ON 1.73 SQ M-ARVRAT: 115 ML/MIN/1.73M2 (ref 60–?)
GLUCOSE BLD-MCNC: 102 MG/DL (ref 70–99)
HCT VFR BLD AUTO: 39.4 %
HGB BLD-MCNC: 13.3 G/DL
IMM GRANULOCYTES # BLD AUTO: 0.03 X10(3) UL (ref 0–1)
IMM GRANULOCYTES NFR BLD: 0.4 %
LYMPHOCYTES # BLD AUTO: 1.41 X10(3) UL (ref 1–4)
LYMPHOCYTES NFR BLD AUTO: 20.8 %
MCH RBC QN AUTO: 28.5 PG (ref 26–34)
MCHC RBC AUTO-ENTMCNC: 33.8 G/DL (ref 31–37)
MCV RBC AUTO: 84.5 FL
MONOCYTES # BLD AUTO: 0.7 X10(3) UL (ref 0.1–1)
MONOCYTES NFR BLD AUTO: 10.3 %
NEUTROPHILS # BLD AUTO: 4.27 X10 (3) UL (ref 1.5–7.7)
NEUTROPHILS # BLD AUTO: 4.27 X10(3) UL (ref 1.5–7.7)
NEUTROPHILS NFR BLD AUTO: 63.1 %
OSMOLALITY SERPL CALC.SUM OF ELEC: 292 MOSM/KG (ref 275–295)
PLATELET # BLD AUTO: 224 10(3)UL (ref 150–450)
POTASSIUM SERPL-SCNC: 3.8 MMOL/L (ref 3.5–5.1)
RBC # BLD AUTO: 4.66 X10(6)UL
SODIUM SERPL-SCNC: 140 MMOL/L (ref 136–145)
WBC # BLD AUTO: 6.8 X10(3) UL (ref 4–11)

## 2022-10-24 PROCEDURE — 99232 SBSQ HOSP IP/OBS MODERATE 35: CPT | Performed by: INTERNAL MEDICINE

## 2022-10-24 RX ORDER — HYDROCODONE BITARTRATE AND ACETAMINOPHEN 5; 325 MG/1; MG/1
1 TABLET ORAL EVERY 6 HOURS PRN
Qty: 30 TABLET | Refills: 0 | Status: SHIPPED | OUTPATIENT
Start: 2022-10-24

## 2022-10-24 RX ORDER — POLYETHYLENE GLYCOL 3350 17 G/17G
17 POWDER, FOR SOLUTION ORAL DAILY
Qty: 14 PACKET | Refills: 0 | Status: SHIPPED | OUTPATIENT
Start: 2022-10-24 | End: 2022-11-07

## 2022-10-24 NOTE — PROGRESS NOTES
Report given to Alison melo RN. Reviewed plan of care and current vital signs, All questions answered, will remain available for any additional questions or concerns at #[id number]. Transport ordered at this time.

## 2022-10-24 NOTE — DISCHARGE INSTRUCTIONS
Home Care Instructions    LAPAROSCOPIC APPENDECTOMY    1. You have incisions with absorbable sutures underneath the skin so no suture removal are needed. 2. You can shower the day after surgery. The incisions can get wet with water and soap. Just pat your incisions dry after showering. Avoid soaking in a bath tub for one week. Avoid heavy lifting (greater than 10 lbs or anything heavier than a gallon of milk) for six weeks after surgery. 3. Be up and around when you are home. The more you walk, the faster your recovery will be.    4. You may have an abdominal binder (medical girdle). Wear it when you are up and moving around. The bottom of the binder should cover a little of your hip bones to provide support to your lower abdomen. Avoid wearing the binder too high as it may make your discomfort worse. 5. Take pain medications around the clock for the first few days after surgery regardless if you have pain or not. The pain medications take about 30 minutes to work so if you wait until you experience pain, then you might be uncomfortable during those 30 minutes. 6.  A well known side effect of pain medication is constipation. It is the number 1, 2, and 3 reason why patients call me after surgery. Adequate hydration and stool softeners are ways to minimize the risk of constipation after surgery. Drink a lot of fluid when you are at home. Check your urine color. If it's dark, then you are dehydrated and need to drink more water. Take as many stool softeners (morning, noon, evening) as you need to have about one bowel movement a day. Don't let four or five days go by without a bowel movement. If that occurs, then you might need a rectal suppository or an enema to treat the constipation. 7.  You may drive when you are no longer taking prescription pain medications with narcotics.   If your degree of discomfort is minimal, extra strength tylenol alternating with ibuprofen could be used as necessary. 8. Please contact the office [phone]) to schedule a clinic visit next week for CARINE removal.      9. Signs and symptoms of post-operative problems include abdominal pain associated with nausea and/or vomiting, fever, chills, excessive drainage or pain at the incision sites, leg swelling/pain, or chest pain. Contact our office immediately if these signs or symptoms occur. 10. If you have any problems or questions please contact me at any time day or night. My cell phone number is [phone].

## 2022-10-25 ENCOUNTER — APPOINTMENT (OUTPATIENT)
Dept: GENERAL RADIOLOGY | Facility: HOSPITAL | Age: 42
End: 2022-10-25
Attending: INTERNAL MEDICINE
Payer: COMMERCIAL

## 2022-10-25 VITALS
OXYGEN SATURATION: 95 % | DIASTOLIC BLOOD PRESSURE: 96 MMHG | RESPIRATION RATE: 18 BRPM | SYSTOLIC BLOOD PRESSURE: 137 MMHG | BODY MASS INDEX: 33 KG/M2 | WEIGHT: 202.31 LBS | HEART RATE: 80 BPM | TEMPERATURE: 98 F

## 2022-10-25 PROCEDURE — 71045 X-RAY EXAM CHEST 1 VIEW: CPT | Performed by: INTERNAL MEDICINE

## 2022-10-25 PROCEDURE — 99238 HOSP IP/OBS DSCHRG MGMT 30/<: CPT | Performed by: INTERNAL MEDICINE

## 2022-10-25 RX ORDER — CLINDAMYCIN HYDROCHLORIDE 300 MG/1
300 CAPSULE ORAL 3 TIMES DAILY
Qty: 15 CAPSULE | Refills: 0 | Status: SHIPPED | OUTPATIENT
Start: 2022-10-25 | End: 2022-11-04

## 2022-10-25 RX ORDER — ACETAMINOPHEN 325 MG/1
650 TABLET ORAL EVERY 6 HOURS PRN
Refills: 0 | Status: SHIPPED | COMMUNITY
Start: 2022-10-25

## 2022-10-25 NOTE — PAYOR COMM NOTE
--------------  10/25 CONTINUED STAY REVIEW    Payor: [employer]  Subscriber #:  [id number]  Authorization Number: [id number]      Plan for DC today.

## 2022-10-25 NOTE — PLAN OF CARE
Patient is alert and oriented x4. Denies any pain. CARINE drain intact and has minimal drainage. Lap sites open to air. Able to void freely. VSS. Heparin and SCDs for DVT prophylaxis. Receiving Merrem antibiotic and tolerating well. Call light within reach. Remains on O2 at 2L per nasal cannula. Sleeping on/off during the night. Problem: Patient Centered Care  Goal: Patient preferences are identified and integrated in the patient's plan of care  Description: Interventions:  - What would you like us to know as we care for you?  I am a [employer] worker and would like to get back to work  - Provide timely, complete, and accurate information to patient/family  - Incorporate patient and family knowledge, values, beliefs, and cultural backgrounds into the planning and delivery of care  - Encourage patient/family to participate in care and decision-making at the level they choose  - Honor patient and family perspectives and choices  Outcome: Progressing     Problem: Patient/Family Goals  Goal: Patient/Family Long Term Goal  Description: Patient's Long Term Goal: go home    Interventions:  - follow MD orders  -fall precautions    - See additional Care Plan goals for specific interventions  Outcome: Progressing  Goal: Patient/Family Short Term Goal  Description: Patient's Short Term Goal: have bowel movement    Interventions:   - adequate fluid intake  -take stool softeners/laxatives    - See additional Care Plan goals for specific interventions  Outcome: Progressing     Problem: GASTROINTESTINAL - ADULT  Goal: Maintains or returns to baseline bowel function  Description: INTERVENTIONS:  - Assess bowel function  - Maintain adequate hydration with IV or PO as ordered and tolerated  - Evaluate effectiveness of GI medications  - Encourage mobilization and activity  - Obtain nutritional consult as needed  - Establish a toileting routine/schedule  - Consider collaborating with pharmacy to review patient's medication profile  Outcome: Progressing     Problem: SKIN/TISSUE INTEGRITY - ADULT  Goal: Skin integrity remains intact  Description: INTERVENTIONS  - Assess and document risk factors for pressure ulcer development  - Assess and document skin integrity  - Monitor for areas of redness and/or skin breakdown  - Initiate interventions, skin care algorithm/standards of care as needed  Outcome: Progressing  Goal: Incision(s), wounds(s) or drain site(s) healing without S/S of infection  Description: INTERVENTIONS:  - Assess and document risk factors for pressure ulcer development  - Assess and document skin integrity  - Assess and document dressing/incision, wound bed, drain sites and surrounding tissue  - Implement wound care per orders  - Initiate isolation precautions as appropriate  - Initiate Pressure Ulcer prevention bundle as indicated  Outcome: Progressing

## 2022-10-26 ENCOUNTER — PATIENT OUTREACH (OUTPATIENT)
Dept: CASE MANAGEMENT | Age: 42
End: 2022-10-26

## 2022-10-26 ENCOUNTER — TELEPHONE (OUTPATIENT)
Dept: PULMONOLOGY | Facility: CLINIC | Age: 42
End: 2022-10-26

## 2022-10-26 DIAGNOSIS — K35.30 ACUTE APPENDICITIS WITH LOCALIZED PERITONITIS WITHOUT PERFORATION, UNSPECIFIED WHETHER ABSCESS PRESENT, UNSPECIFIED WHETHER GANGRENE PRESENT: ICD-10-CM

## 2022-10-26 DIAGNOSIS — Z02.9 ENCOUNTERS FOR UNSPECIFIED ADMINISTRATIVE PURPOSE: ICD-10-CM

## 2022-10-26 PROCEDURE — 1111F DSCHRG MED/CURRENT MED MERGE: CPT

## 2022-10-26 NOTE — TELEPHONE ENCOUNTER
Spoke to pt for TCM today--now has 1 wk f/u with gen sx 11/01/2022--does not have TCM/HFU with Dr. Iona Singletary. TCM/HFU appt recommended by 11/01/2022, as pt is a high risk for readmission. Please advise. BOOK BY DATE (last date for TCM): 11/08/2022    Please discuss with PCP need for TCM/HFU and f/u with pt, accordingly. Thank you!      Future Appointments   Date Time Provider Sanchez Holden   11/1/2022  2:00 PM Jarvis Martin MD [facility]   2/27/2023  2:45 PM Bard Prateek Dick MD [facility]

## 2022-10-26 NOTE — PROGRESS NOTES
Left message on mailbox for pt to call NCM back for TCM. NCM contact information included in message.       Follow up With Specialties Details Why Contact Info   Olive Caal MD SURGERY, GENERAL Call on 11/1/2022 CARINE removal [address] [facility] [address]   [phone]

## 2022-10-27 NOTE — TELEPHONE ENCOUNTER
Dr. Perla Espinal - Patient discharged 10/25/22. TCM book by date is 11/8/22. When do you want to see patient?

## 2022-10-31 NOTE — TELEPHONE ENCOUNTER
Spoke with patient offered appointment on 11/7/22, patient unable to make appointment. [facility] follow up appointment scheduled with Dr. Reyes Carrasco on 11/15/22 at 12:15pm.  Verified date, time, location & parking, patient verbalized understanding.

## 2022-11-01 ENCOUNTER — OFFICE VISIT (OUTPATIENT)
Dept: SURGERY | Facility: CLINIC | Age: 42
End: 2022-11-01
Payer: COMMERCIAL

## 2022-11-01 VITALS — WEIGHT: 202 LBS | HEIGHT: 66 IN | BODY MASS INDEX: 32.47 KG/M2

## 2022-11-01 DIAGNOSIS — Z09 POSTOPERATIVE EXAMINATION: Primary | ICD-10-CM

## 2022-11-01 PROCEDURE — 99024 POSTOP FOLLOW-UP VISIT: CPT | Performed by: SURGERY

## 2022-11-01 PROCEDURE — 3008F BODY MASS INDEX DOCD: CPT | Performed by: SURGERY

## 2022-11-03 PROBLEM — Z09 POSTOPERATIVE EXAMINATION: Status: ACTIVE | Noted: 2022-11-03

## 2022-11-07 RX ORDER — ALPRAZOLAM 1 MG/1
TABLET ORAL
Qty: 30 TABLET | Refills: 0 | Status: SHIPPED | OUTPATIENT
Start: 2022-11-07

## 2022-11-08 ENCOUNTER — TELEPHONE (OUTPATIENT)
Dept: SURGERY | Facility: CLINIC | Age: 42
End: 2022-11-08

## 2022-11-08 NOTE — TELEPHONE ENCOUNTER
Pt dropped off paper work to be completed and signed. RUSTY is complete,  Sent to Forms and placed in bin @ [facility].

## 2022-11-09 NOTE — TELEPHONE ENCOUNTER
Dr. Elena Carolina,      Please sign off on form:RTW   -Highlight the patient and hit \"Chart\" button. -In Chart Review, w/in the Encounter tab - click 1 time on the Telephone call encounter for 11/8/22 Scroll down the telephone encounter.  -Click \"scan on\" blue Hyperlink under \"Media\" heading for RTW Dr. Elena Carolina 11/9/22 w/in the telephone enc.  -Click on Acknowledge button at the bottom right corner and left-click onto image, signature stamp appears and drag signature to Provider signature line. Stamp will turn blue. Close window.      Thank you,

## 2022-11-11 NOTE — TELEPHONE ENCOUNTER
Forms completed and e-faxed to [facility] at # [phone]. [employer] message sent to pt.

## 2022-11-15 ENCOUNTER — OFFICE VISIT (OUTPATIENT)
Dept: PULMONOLOGY | Facility: CLINIC | Age: 42
End: 2022-11-15
Payer: COMMERCIAL

## 2022-11-15 VITALS
HEART RATE: 85 BPM | DIASTOLIC BLOOD PRESSURE: 85 MMHG | BODY MASS INDEX: 32.78 KG/M2 | SYSTOLIC BLOOD PRESSURE: 141 MMHG | HEIGHT: 66 IN | RESPIRATION RATE: 16 BRPM | OXYGEN SATURATION: 98 % | WEIGHT: 204 LBS

## 2022-11-15 DIAGNOSIS — R25.2 LEG CRAMP: Primary | ICD-10-CM

## 2022-11-15 DIAGNOSIS — G47.33 OSA (OBSTRUCTIVE SLEEP APNEA): ICD-10-CM

## 2022-11-15 DIAGNOSIS — K35.31 ACUTE APPENDICITIS WITH LOCALIZED PERITONITIS AND GANGRENE, WITHOUT PERFORATION OR ABSCESS: ICD-10-CM

## 2022-11-15 PROCEDURE — 99213 OFFICE O/P EST LOW 20 MIN: CPT | Performed by: INTERNAL MEDICINE

## 2022-11-15 PROCEDURE — 3079F DIAST BP 80-89 MM HG: CPT | Performed by: INTERNAL MEDICINE

## 2022-11-15 PROCEDURE — 3077F SYST BP >= 140 MM HG: CPT | Performed by: INTERNAL MEDICINE

## 2022-11-15 PROCEDURE — 3008F BODY MASS INDEX DOCD: CPT | Performed by: INTERNAL MEDICINE

## 2022-11-15 NOTE — PROGRESS NOTES
The patient is a 5year-old male who comes in now for follow-up after having been hospitalized for appendectomy. He did well clinically. He had infiltrates and dyspnea but recovered nicely. He now has a cramp in the right calf. Review of Systems:  Vision normal. Ear nose and throat normal. Bowel normal. Bladder function normal. No depression. No thyroid disease. No lymphatic system concerns. No rash. Muscles and joints unremarkable. No weight loss no weight gain. Physical Examination:  Vital signs normal. HEENT examination is unremarkable with pupils equal round and reactive to light and accommodation. Neck without adenopathy, thyromegaly, JVD nor bruit. Lungs clear to auscultation and percussion. Cardiac regular rate and rhythm no murmur. Abdomen nontender, without hepatosplenomegaly and no mass appreciable. Extremities and Musculoskeletal without clubbing cyanosis nor edema, and mobility acceptable. Neurologic grossly intact with symmetric tone and strength and reflex. Assessment and plan:  1. Right lower extremity calf cramp-we will check ultrasound to rule out DVT postop. 2.  Obstructive sleep apnea-baseline apnea events 11/h rising to 17/h in rem sleep. Will refer for CPAP titration. 3.  Status post appendectomy    4. Respiratory- dyspnea syndrome resolved and lungs are clear now. 5.  General health- the patient should never drink and drive, always wear a safety belt, have a smoke detector and fire extiguisher in the house, avoid the use of candles in the home as they are the most common cause of housefire, and see me annually for complete examination.

## 2022-11-18 NOTE — TELEPHONE ENCOUNTER
ZOILA 11-18-22. LM for patient at 1:00. Forms dept [phone]  Office to [facility] p/o [phone].      VIJAY

## 2022-11-30 ENCOUNTER — TELEPHONE (OUTPATIENT)
Dept: PULMONOLOGY | Facility: CLINIC | Age: 42
End: 2022-11-30

## 2022-11-30 NOTE — TELEPHONE ENCOUNTER
Received fax and request for standard written order for oral appliance, chart notes discussing CPAP intolerance, and sleep study reports. Per 11/5/22 chart notes, CPAP titration was ordered. Left message for patient to call back to discuss.

## 2022-12-05 RX ORDER — ESCITALOPRAM OXALATE 20 MG/1
TABLET ORAL
Qty: 30 TABLET | Refills: 5 | Status: SHIPPED | OUTPATIENT
Start: 2022-12-05

## 2022-12-06 ENCOUNTER — OFFICE VISIT (OUTPATIENT)
Dept: SURGERY | Facility: CLINIC | Age: 42
End: 2022-12-06
Payer: COMMERCIAL

## 2022-12-06 ENCOUNTER — ORDER TRANSCRIPTION (OUTPATIENT)
Dept: SLEEP CENTER | Age: 42
End: 2022-12-06

## 2022-12-06 DIAGNOSIS — Z01.818 PREOPERATIVE TESTING: Primary | ICD-10-CM

## 2022-12-06 DIAGNOSIS — Z09 POSTOPERATIVE EXAMINATION: Primary | ICD-10-CM

## 2022-12-06 DIAGNOSIS — Z11.59 SCREENING FOR VIRAL DISEASE: ICD-10-CM

## 2022-12-06 PROCEDURE — 99024 POSTOP FOLLOW-UP VISIT: CPT | Performed by: SURGERY

## 2022-12-08 NOTE — TELEPHONE ENCOUNTER
Spoke with patient, he states he is pursuing CPAP titration which is scheduled for 2/5/22. No plans for oral appliance, just getting general dental work of filling and possible crown. Will disregard orders from [address] [place] [address] for oral appliance therapy.

## 2022-12-13 NOTE — PROGRESS NOTES
Patient presents with:  Post-Op: Pt here for post op s/p lap appy on 10/21/2022. Pt denies abdominal pain. VSS  Gen:  NAD  Abd:  Soft, NTND    AP:  Doing well sp lap appy. No heavy lifting for a total of six weeks. F/u prn.

## 2022-12-19 RX ORDER — ALPRAZOLAM 1 MG/1
TABLET ORAL
Qty: 30 TABLET | Refills: 0 | Status: SHIPPED | OUTPATIENT
Start: 2022-12-19

## 2022-12-20 ENCOUNTER — TELEPHONE (OUTPATIENT)
Dept: SLEEP CENTER | Age: 42
End: 2022-12-20

## 2022-12-22 ENCOUNTER — HOSPITAL ENCOUNTER (OUTPATIENT)
Dept: ULTRASOUND IMAGING | Age: 42
Discharge: HOME | End: 2022-12-22
Attending: INTERNAL MEDICINE
Payer: COMMERCIAL

## 2022-12-22 DIAGNOSIS — R25.2 LEG CRAMP: ICD-10-CM

## 2022-12-22 PROCEDURE — 93970 EXTREMITY STUDY: CPT | Performed by: INTERNAL MEDICINE

## 2023-01-17 RX ORDER — ALPRAZOLAM 1 MG/1
TABLET ORAL
Qty: 30 TABLET | Refills: 0 | Status: SHIPPED | OUTPATIENT
Start: 2023-01-17

## 2023-01-23 ENCOUNTER — LAB ENCOUNTER (OUTPATIENT)
Dept: LAB | Age: 43
End: 2023-01-23
Attending: INTERNAL MEDICINE
Payer: COMMERCIAL

## 2023-01-23 DIAGNOSIS — Z11.59 SCREENING FOR VIRAL DISEASE: ICD-10-CM

## 2023-01-23 DIAGNOSIS — Z01.818 PREOPERATIVE TESTING: ICD-10-CM

## 2023-01-24 LAB — SARS-COV-2 RNA RESP QL NAA+PROBE: NOT DETECTED

## 2023-01-24 RX ORDER — ATORVASTATIN CALCIUM 10 MG/1
TABLET, FILM COATED ORAL
Qty: 30 TABLET | Refills: 2 | Status: SHIPPED | OUTPATIENT
Start: 2023-01-24

## 2023-01-25 ENCOUNTER — OFFICE VISIT (OUTPATIENT)
Dept: SLEEP CENTER | Age: 43
End: 2023-01-25
Attending: INTERNAL MEDICINE
Payer: COMMERCIAL

## 2023-01-25 DIAGNOSIS — G47.33 OSA (OBSTRUCTIVE SLEEP APNEA): ICD-10-CM

## 2023-01-25 DIAGNOSIS — Z11.59 SCREENING FOR VIRAL DISEASE: ICD-10-CM

## 2023-01-25 DIAGNOSIS — Z01.818 PREOPERATIVE TESTING: ICD-10-CM

## 2023-01-25 PROCEDURE — 95811 POLYSOM 6/>YRS CPAP 4/> PARM: CPT

## 2023-02-10 ENCOUNTER — TELEPHONE (OUTPATIENT)
Dept: PULMONOLOGY | Facility: CLINIC | Age: 43
End: 2023-02-10

## 2023-02-10 NOTE — TELEPHONE ENCOUNTER
santi is calling to request a new sleep study for patient. States that [facility] is requesting a new one.

## 2023-02-13 ENCOUNTER — TELEPHONE (OUTPATIENT)
Dept: PULMONOLOGY | Facility: CLINIC | Age: 43
End: 2023-02-13

## 2023-02-13 NOTE — TELEPHONE ENCOUNTER
Blood sugar at this time was 33. Glucose gel administered as ordered. Baby nippled 38 cc infant formula at this time. Will re check blood glucose in 1 hour.    Patient states he has not received not CPap machine. Please call. Thank you.

## 2023-02-13 NOTE — TELEPHONE ENCOUNTER
Spoke to [address] [place] [address] at [address] [place] [address] who stated baseline was in 2019 and titration was 1/2023. [facility] requires documentation as to why patient was not setup in 2019. Dr. 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

## 2023-02-13 NOTE — TELEPHONE ENCOUNTER
Patient returning call, patient awaiting cpap machine, only received mask and hose. Please call at [phone],[facility].

## 2023-02-27 ENCOUNTER — OFFICE VISIT (OUTPATIENT)
Dept: PULMONOLOGY | Facility: CLINIC | Age: 43
End: 2023-02-27

## 2023-02-27 VITALS
HEART RATE: 74 BPM | BODY MASS INDEX: 34 KG/M2 | DIASTOLIC BLOOD PRESSURE: 95 MMHG | WEIGHT: 209 LBS | SYSTOLIC BLOOD PRESSURE: 145 MMHG | OXYGEN SATURATION: 93 %

## 2023-02-27 DIAGNOSIS — R53.83 OTHER FATIGUE: Primary | ICD-10-CM

## 2023-02-27 PROCEDURE — 99213 OFFICE O/P EST LOW 20 MIN: CPT | Performed by: INTERNAL MEDICINE

## 2023-02-27 PROCEDURE — 3077F SYST BP >= 140 MM HG: CPT | Performed by: INTERNAL MEDICINE

## 2023-02-27 PROCEDURE — 3080F DIAST BP >= 90 MM HG: CPT | Performed by: INTERNAL MEDICINE

## 2023-02-27 RX ORDER — ARIPIPRAZOLE 5 MG/1
5 TABLET ORAL NIGHTLY
COMMUNITY
Start: 2023-02-07

## 2023-02-27 NOTE — PROGRESS NOTES
The patient is a 32-year-old male who I know well from prior evaluation comes in now for follow-up. She needs to be set up with CPAP equipment. He was recently titrated. There has been some delay in getting him set up. Review of Systems:  Vision normal. Ear nose and throat normal. Bowel normal. Bladder function normal. No depression. No thyroid disease. No lymphatic system concerns. No rash. Muscles and joints unremarkable. No weight loss no weight gain. Physical Examination:  Vital signs normal. HEENT examination is unremarkable with pupils equal round and reactive to light and accommodation. Neck without adenopathy, thyromegaly, JVD nor bruit. Lungs clear to auscultation and percussion. Cardiac regular rate and rhythm no murmur. Abdomen nontender, without hepatosplenomegaly and no mass appreciable. Extremities and Musculoskeletal without clubbing cyanosis nor edema, and mobility acceptable. Neurologic grossly intact with symmetric tone and strength and reflex. Assessment and plan:  1. Fatigue with untreated obstructive sleep apnea- the patient would benefit from initiating the CPAP. 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 He was able ultimately to return to the sleep lab for an overnight test.  It was because of his obligation to care for his mother that there was delay in returning to the sleep lab. Recommendations: We will appeal to the equipment company to provide patient with CPAP. Weight loss, avoid alcohol, avoid sedating drug and never drive if sleepy. 2.  Prior history of calf cramp-negative for DVT    3.  Status post appendectomy    4. Dyspnea-resolved    5.   General health-the patient should never drink and drive, always wear a safety belt, have a smoke detector and fire extiguisher in the house, avoid the use of candles in the home as they are the most common cause of housefire, and see me annually for complete examination.

## 2023-03-15 ENCOUNTER — APPOINTMENT (OUTPATIENT)
Dept: GENERAL RADIOLOGY | Age: 43
End: 2023-03-15
Attending: NURSE PRACTITIONER
Payer: COMMERCIAL

## 2023-03-15 ENCOUNTER — HOSPITAL ENCOUNTER (OUTPATIENT)
Age: 43
Discharge: HOME | End: 2023-03-15
Payer: COMMERCIAL

## 2023-03-15 VITALS
DIASTOLIC BLOOD PRESSURE: 88 MMHG | HEART RATE: 83 BPM | SYSTOLIC BLOOD PRESSURE: 130 MMHG | RESPIRATION RATE: 18 BRPM | TEMPERATURE: 98 F | OXYGEN SATURATION: 98 %

## 2023-03-15 DIAGNOSIS — M54.50 LOW BACK PAIN AT MULTIPLE SITES: Primary | ICD-10-CM

## 2023-03-15 PROCEDURE — 72100 X-RAY EXAM L-S SPINE 2/3 VWS: CPT | Performed by: NURSE PRACTITIONER

## 2023-03-15 RX ORDER — LIDOCAINE 50 MG/G
1 PATCH TOPICAL EVERY 24 HOURS
Qty: 15 PATCH | Refills: 0 | Status: SHIPPED | OUTPATIENT
Start: 2023-03-15 | End: 2023-03-30

## 2023-03-16 ENCOUNTER — TELEPHONE (OUTPATIENT)
Dept: NEUROLOGY | Facility: CLINIC | Age: 43
End: 2023-03-16

## 2023-03-16 NOTE — DISCHARGE INSTRUCTIONS
Please use patch as prescribed. Close follow-up is recommended. Any loss of bowel or bladder control or numbness and tingling to lower extremities please go to the emergency department.

## 2023-03-25 ENCOUNTER — OFFICE VISIT (OUTPATIENT)
Dept: SLEEP CENTER | Age: 43
End: 2023-03-25
Attending: INTERNAL MEDICINE
Payer: COMMERCIAL

## 2023-03-25 DIAGNOSIS — G47.33 OSA (OBSTRUCTIVE SLEEP APNEA): Primary | ICD-10-CM

## 2023-03-25 PROCEDURE — 95810 POLYSOM 6/> YRS 4/> PARAM: CPT

## 2023-03-31 ENCOUNTER — TELEPHONE (OUTPATIENT)
Dept: PULMONOLOGY | Facility: CLINIC | Age: 43
End: 2023-03-31

## 2023-03-31 DIAGNOSIS — R06.02 SHORTNESS OF BREATH: ICD-10-CM

## 2023-03-31 DIAGNOSIS — G47.33 OSA (OBSTRUCTIVE SLEEP APNEA): Primary | ICD-10-CM

## 2023-03-31 DIAGNOSIS — R53.83 FATIGUE, UNSPECIFIED TYPE: ICD-10-CM

## 2023-03-31 NOTE — TELEPHONE ENCOUNTER
Spoke to patient, informed them of Dr. Gina mcgill. CPAP titration study ordered. Provided patient with sleep center phone number [phone] to schedule CPAP titration study. Patient verbalized understanding.

## 2023-03-31 NOTE — TELEPHONE ENCOUNTER
Spoke with patient, states they are experiencing shortness of breath while walking around at work for [address]. Patient has nasal congestion and is tired and sleeping during daytime. No shortness of breath at rest.    Patient denies all of the following: wheezing, chest pain, chest tightness, fever. Taking ibuprofen for back pain and nasal congestion.

## 2023-03-31 NOTE — TELEPHONE ENCOUNTER
Pt is calling back requesting to speak with RN again states he is not feeling well and shortness of breath

## 2023-03-31 NOTE — TELEPHONE ENCOUNTER
----- Message from Karen Lloyd MD sent at 3/30/2023  7:22 PM CDT -----  RN, please call the patient to explain that he has moderate obstructive sleep apnea and facilitate CPAP titration.

## 2023-04-03 NOTE — TELEPHONE ENCOUNTER
Spoke with patient, informed them of Dr. Diogo Braun order of chest x-ray and labs. Informed patient they can complete these at [facility], verified location. Patient verbalized understanding.

## 2023-04-14 ENCOUNTER — HOSPITAL ENCOUNTER (OUTPATIENT)
Dept: GENERAL RADIOLOGY | Age: 43
Discharge: HOME | End: 2023-04-14
Attending: INTERNAL MEDICINE
Payer: COMMERCIAL

## 2023-04-14 ENCOUNTER — OFFICE VISIT (OUTPATIENT)
Dept: SLEEP CENTER | Age: 43
End: 2023-04-14
Attending: INTERNAL MEDICINE
Payer: COMMERCIAL

## 2023-04-14 ENCOUNTER — LAB ENCOUNTER (OUTPATIENT)
Dept: LAB | Age: 43
End: 2023-04-14
Attending: INTERNAL MEDICINE
Payer: COMMERCIAL

## 2023-04-14 DIAGNOSIS — R06.02 SHORTNESS OF BREATH: ICD-10-CM

## 2023-04-14 DIAGNOSIS — R53.83 FATIGUE, UNSPECIFIED TYPE: ICD-10-CM

## 2023-04-14 DIAGNOSIS — G47.33 OSA (OBSTRUCTIVE SLEEP APNEA): ICD-10-CM

## 2023-04-14 LAB
BASOPHILS # BLD AUTO: 0.1 X10(3) UL (ref 0–0.2)
BASOPHILS NFR BLD AUTO: 1.3 %
DEPRECATED RDW RBC AUTO: 35.9 FL (ref 35.1–46.3)
EOSINOPHIL # BLD AUTO: 0.21 X10(3) UL (ref 0–0.7)
EOSINOPHIL NFR BLD AUTO: 2.6 %
ERYTHROCYTE [DISTWIDTH] IN BLOOD BY AUTOMATED COUNT: 12.1 % (ref 11–15)
HCT VFR BLD AUTO: 45 %
HGB BLD-MCNC: 15.1 G/DL
IMM GRANULOCYTES # BLD AUTO: 0.02 X10(3) UL (ref 0–1)
IMM GRANULOCYTES NFR BLD: 0.3 %
LYMPHOCYTES # BLD AUTO: 2.02 X10(3) UL (ref 1–4)
LYMPHOCYTES NFR BLD AUTO: 25.3 %
MCH RBC QN AUTO: 27.3 PG (ref 26–34)
MCHC RBC AUTO-ENTMCNC: 33.6 G/DL (ref 31–37)
MCV RBC AUTO: 81.4 FL
MONOCYTES # BLD AUTO: 0.66 X10(3) UL (ref 0.1–1)
MONOCYTES NFR BLD AUTO: 8.3 %
NEUTROPHILS # BLD AUTO: 4.97 X10 (3) UL (ref 1.5–7.7)
NEUTROPHILS # BLD AUTO: 4.97 X10(3) UL (ref 1.5–7.7)
NEUTROPHILS NFR BLD AUTO: 62.2 %
NT-PROBNP SERPL-MCNC: 34 PG/ML (ref ?–125)
PLATELET # BLD AUTO: 287 10(3)UL (ref 150–450)
RBC # BLD AUTO: 5.53 X10(6)UL
TSI SER-ACNC: 1.42 MIU/ML (ref 0.36–3.74)
WBC # BLD AUTO: 8 X10(3) UL (ref 4–11)

## 2023-04-14 PROCEDURE — 83880 ASSAY OF NATRIURETIC PEPTIDE: CPT

## 2023-04-14 PROCEDURE — 36415 COLL VENOUS BLD VENIPUNCTURE: CPT

## 2023-04-14 PROCEDURE — 95811 POLYSOM 6/>YRS CPAP 4/> PARM: CPT

## 2023-04-14 PROCEDURE — 84443 ASSAY THYROID STIM HORMONE: CPT

## 2023-04-14 PROCEDURE — 85025 COMPLETE CBC W/AUTO DIFF WBC: CPT

## 2023-04-14 PROCEDURE — 71046 X-RAY EXAM CHEST 2 VIEWS: CPT | Performed by: INTERNAL MEDICINE

## 2023-04-20 ENCOUNTER — TELEPHONE (OUTPATIENT)
Dept: PULMONOLOGY | Facility: CLINIC | Age: 43
End: 2023-04-20

## 2023-04-20 DIAGNOSIS — G47.33 OSA (OBSTRUCTIVE SLEEP APNEA): Primary | ICD-10-CM

## 2023-04-20 NOTE — TELEPHONE ENCOUNTER
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

## 2023-04-20 NOTE — TELEPHONE ENCOUNTER
----- Message from Kasey Ballesteros MD sent at 4/16/2023  2:45 PM CDT -----  RN, please let the patient know that his chest x-ray and blood tests were completely unremarkable.   Agosto Pals

## 2023-04-20 NOTE — TELEPHONE ENCOUNTER
Patient is requesting to get a note to excuse him from working outside, due to possibility of getting stung by bee's, which can be fatal to the patient. Patient would like to get the note sent to his Mychart and patient would like to  note from our office.

## 2023-04-20 NOTE — TELEPHONE ENCOUNTER
----- Message from Avni Driver MD sent at 4/18/2023 11:34 AM CDT -----  RN, please call the patient to facilitate set up of CPAP 12 CWP with appropriate follow-up.   Pat Stephens

## 2023-04-21 NOTE — TELEPHONE ENCOUNTER
Discussed below w/ pt. Explained letter was sent via [employer] & to let us know if we may be of further assistance. He voiced understanding & was agreeable.

## 2023-04-21 NOTE — TELEPHONE ENCOUNTER
Discussed below orders from Dr. Eduardo Watters w/ pt. Instructed him to f/u w/ Dr. Eduardo Watters w/in 30-90 days of receipt of CPAP for efficacy & compliance. Explained he should contact [facility] if he doesn't hear anything shortly. Phone # given. Pt voiced understanding.

## 2023-04-24 NOTE — TELEPHONE ENCOUNTER
CPAP order, face sheet, baseline sleep study, CPAP titration and office visit notes faxed to [facility] at [phone]. Fax confirmation received.

## 2023-04-26 RX ORDER — EPINEPHRINE 0.3 MG/.3ML
INJECTION SUBCUTANEOUS
Qty: 1 EACH | Refills: 1 | Status: SHIPPED | OUTPATIENT
Start: 2023-04-26

## 2023-04-26 NOTE — TELEPHONE ENCOUNTER
LOV: 2/27/2023  Last refill: 5/12/2021    Dr. Kwabena Perez review and sign Epi-pen order if agreeable.

## 2023-05-18 ENCOUNTER — TELEPHONE (OUTPATIENT)
Dept: PULMONOLOGY | Facility: CLINIC | Age: 43
End: 2023-05-18

## 2023-05-18 NOTE — TELEPHONE ENCOUNTER
Received fax from [employer] stating patient has received cpap machine and needs a compliance visit between 6/13/23 and 8/11/2023. Left message to call back and schedule appointment between those dates. My chart message also sent.

## 2023-06-14 RX ORDER — ATORVASTATIN CALCIUM 10 MG/1
TABLET, FILM COATED ORAL
Qty: 30 TABLET | Refills: 5 | Status: SHIPPED | OUTPATIENT
Start: 2023-06-14

## 2023-06-15 RX ORDER — ESCITALOPRAM OXALATE 20 MG/1
20 TABLET ORAL DAILY
Qty: 30 TABLET | Refills: 0 | Status: SHIPPED | OUTPATIENT
Start: 2023-06-15

## 2023-06-15 NOTE — TELEPHONE ENCOUNTER
Dr. Zeinab Lane was paged regarding urgent refill request for escitalopram. Per Dr. Zeinab Lane, ok to give 1 month supply, no refills. Rx sent to [facility] and patient was notified.

## 2023-06-15 NOTE — TELEPHONE ENCOUNTER
LOV: 2/27/2023  Last refill: 12/05/2022    Dr. Danyell Bruner review and sign pended prescription if agreeable.

## 2023-07-12 RX ORDER — ESCITALOPRAM OXALATE 20 MG/1
20 TABLET ORAL DAILY
Qty: 30 TABLET | Refills: 0 | Status: SHIPPED | OUTPATIENT
Start: 2023-07-12

## 2023-08-09 RX ORDER — ESCITALOPRAM OXALATE 20 MG/1
20 TABLET ORAL DAILY
Qty: 30 TABLET | Refills: 5 | Status: SHIPPED | OUTPATIENT
Start: 2023-08-09

## 2023-08-09 NOTE — TELEPHONE ENCOUNTER
LOV: 2/27/2023  Last refill: 7/12/2023    Dr. Jimenes Favorite review and sign pended prescription if agreeable.

## 2023-12-27 RX ORDER — ATORVASTATIN CALCIUM 10 MG/1
10 TABLET, FILM COATED ORAL EVERY EVENING
Qty: 30 TABLET | Refills: 5 | Status: SHIPPED | OUTPATIENT
Start: 2023-12-27

## 2023-12-27 NOTE — TELEPHONE ENCOUNTER
LOV: 2/27/2023  Last refill: 08/09/2023     Dr. Brenda Powell review and sign pended prescription if agreeable.

## 2024-02-23 RX ORDER — ESCITALOPRAM OXALATE 20 MG/1
20 TABLET ORAL DAILY
Qty: 30 TABLET | Refills: 5 | Status: SHIPPED | OUTPATIENT
Start: 2024-02-23

## 2024-02-23 NOTE — TELEPHONE ENCOUNTER
Last office visit: 2/27/23  Last refill: 8/9/23  Upcoming appointment: NONE    Dr Dick please review/sign pended refill request if agreeable.

## 2024-05-07 NOTE — TELEPHONE ENCOUNTER
Informed patient via [employer] that appointment is needed for refill to be signed. Provided patient with phone number to schedule.

## 2024-05-14 NOTE — TELEPHONE ENCOUNTER
Last office visit:2/7/2023  Last refill: 1/17/2023  Upcoming appointment: No upcoming appointment    Called patient and left message to call back.

## 2024-05-17 RX ORDER — ALPRAZOLAM 1 MG/1
TABLET ORAL
Qty: 30 TABLET | Refills: 0 | OUTPATIENT
Start: 2024-05-17

## 2024-06-24 NOTE — TELEPHONE ENCOUNTER
Sent patient mychart informing that appointment is needed for atorvastatin refill to be approved. Provided patient with phone number to schedule appointment    Last office visit: 2/27/23

## 2024-06-27 NOTE — TELEPHONE ENCOUNTER
Last office visit:2/27/2023  Last refill:12/27/2023  Upcoming appointment: No upcoming appointment.    Called patient and left message to call office back at #[phone].

## 2024-07-02 ENCOUNTER — TELEPHONE (OUTPATIENT)
Dept: INTERNAL MEDICINE UNIT | Facility: HOSPITAL | Age: 44
End: 2024-07-02

## 2024-07-02 NOTE — TELEPHONE ENCOUNTER
Received call from patient. [facility], which not the office patient was attempting to contact. Patient was transferred to the wrong office. Advised patient to call different number.

## 2024-07-02 NOTE — TELEPHONE ENCOUNTER
Received call from patient. [facility], which not the office patient was attempting to contact. Patient was transferred to the wrong office. Advised patient to call different number.

## 2024-07-02 NOTE — TELEPHONE ENCOUNTER
Attempted to call patient regarding medication refill and left message to call back at #[phone].

## 2024-07-03 RX ORDER — ATORVASTATIN CALCIUM 10 MG/1
10 TABLET, FILM COATED ORAL EVERY EVENING
Qty: 30 TABLET | Refills: 5 | OUTPATIENT
Start: 2024-07-03

## 2024-07-03 NOTE — TELEPHONE ENCOUNTER
Attempted to call patient to schedule follow up for further refills. Medication has been denied.

## 2024-07-08 ENCOUNTER — TELEPHONE (OUTPATIENT)
Dept: PULMONOLOGY | Facility: CLINIC | Age: 44
End: 2024-07-08

## 2024-07-08 ENCOUNTER — OFFICE VISIT (OUTPATIENT)
Dept: PULMONOLOGY | Facility: CLINIC | Age: 44
End: 2024-07-08

## 2024-07-08 VITALS
DIASTOLIC BLOOD PRESSURE: 82 MMHG | OXYGEN SATURATION: 96 % | HEART RATE: 79 BPM | HEIGHT: 66 IN | WEIGHT: 204 LBS | SYSTOLIC BLOOD PRESSURE: 133 MMHG | BODY MASS INDEX: 32.78 KG/M2

## 2024-07-08 DIAGNOSIS — E78.5 DYSLIPIDEMIA: ICD-10-CM

## 2024-07-08 DIAGNOSIS — G25.81 RLS (RESTLESS LEGS SYNDROME): ICD-10-CM

## 2024-07-08 DIAGNOSIS — J40 BRONCHITIS: Primary | ICD-10-CM

## 2024-07-08 PROCEDURE — 3008F BODY MASS INDEX DOCD: CPT | Performed by: INTERNAL MEDICINE

## 2024-07-08 PROCEDURE — 3079F DIAST BP 80-89 MM HG: CPT | Performed by: INTERNAL MEDICINE

## 2024-07-08 PROCEDURE — 99213 OFFICE O/P EST LOW 20 MIN: CPT | Performed by: INTERNAL MEDICINE

## 2024-07-08 PROCEDURE — 3075F SYST BP GE 130 - 139MM HG: CPT | Performed by: INTERNAL MEDICINE

## 2024-07-08 RX ORDER — ROPINIROLE 0.5 MG/1
0.5 TABLET, FILM COATED ORAL NIGHTLY
Qty: 30 TABLET | Refills: 5 | Status: SHIPPED | OUTPATIENT
Start: 2024-07-08 | End: 2025-01-04

## 2024-07-08 RX ORDER — ATORVASTATIN CALCIUM 10 MG/1
10 TABLET, FILM COATED ORAL EVERY EVENING
Qty: 30 TABLET | Refills: 5 | Status: SHIPPED | OUTPATIENT
Start: 2024-07-08

## 2024-07-08 NOTE — PROGRESS NOTES
The patient is a 43-year-old male who I know well from prior evaluation comes in now for follow-up.  In general, he is doing well except that he notes that he has RLS that bothers his sleep when he is trying to go to bed at night.  He notes that his depression is resolved and he would like to get off the antidepressant.  He will still get an occasional calf cramps.  He has not been able to tolerate the CPAP.  He is willing to try an oral appliance.    Review of Systems:  Vision normal. Ear nose and throat normal. Bowel normal. Bladder function normal. No depression. No thyroid disease. No lymphatic system concerns.  No rash. Muscles and joints unremarkable. No weight loss no weight gain.    Physical Examination:  Vital signs normal. HEENT examination is unremarkable with pupils equal round and reactive to light and accommodation. Neck without adenopathy, thyromegaly, JVD nor bruit. Lungs clear to auscultation and percussion. Cardiac regular rate and rhythm no murmur. Abdomen nontender, without hepatosplenomegaly and no mass appreciable. Extremities and Musculoskeletal without clubbing cyanosis nor edema, and mobility acceptable. Neurologic grossly intact with symmetric tone and strength and reflex.    Assessment and plan:  1.  Restless leg syndrome-we will try ropinirole as symptoms are mostly at night.  Will use 0.5 mg nightly and the patient to call me in a month to let me know how he is done with this.  2.  Status post appendectomy  3.  Depression-resolved.  Okay to stop the Lexapro.  4.  Obstructive sleep apnea-will refer to [facility].  Weight loss, avoid alcohol and sedating drug and never drive if sleepy  5.  General health-the patient should never drink and drive, always wear a safety belt, have a smoke detector and fire extiguisher in the house, avoid the use of candles in the home as they are the most common cause of housefire, and see me annually for complete examination.  Will send lipid panel.  6.

## 2024-07-08 NOTE — TELEPHONE ENCOUNTER
Per office visit: patient to call in a month to let Dr. Dick know how he is doing after starting ropinirole. Patient informed of this and verbalized understanding.

## 2024-07-08 NOTE — TELEPHONE ENCOUNTER
Last office visit 2/27/23. Attempted to contact patient, left message to call back. Follow-up appointment needs to be scheduled for further refills.

## 2024-07-08 NOTE — TELEPHONE ENCOUNTER
Patient calling regards refill request, please call.  States is out. ([facility] pharmacy on file)   atorvastatin 10 MG Oral Tab

## 2024-07-08 NOTE — TELEPHONE ENCOUNTER
Patient states that Dr.. BAEZ wants him to return in a month to follow up on his MEDS.  Next appointment is in October, 2024.

## 2024-07-09 ENCOUNTER — HOSPITAL ENCOUNTER (OUTPATIENT)
Dept: CT IMAGING | Facility: HOSPITAL | Age: 44
Discharge: HOME | End: 2024-07-09
Attending: SPECIALIST
Payer: COMMERCIAL

## 2024-07-09 DIAGNOSIS — J32.9 CHRONIC SINUSITIS, UNSPECIFIED LOCATION: ICD-10-CM

## 2024-07-09 PROCEDURE — 70486 CT MAXILLOFACIAL W/O DYE: CPT | Performed by: SPECIALIST

## 2024-07-09 NOTE — PROGRESS NOTES
DISCHARGE INSTRUCTIONS     As part of your transition home, we are providing you with this set of discharge instructions, as a guide to help you in that process. In addition to the care provided during your hospital stay, there may be upcoming clinic visits, laboratory appointments, and/or results noted on this After Visit Summary (AVS).     To assist the physicians caring for you during this transition, we would like you remind you of the followin. Please call a Provider for help if you experience any of the following: Any questions or concerns  2. Activity Instructions: Normal activity as tolerated  3. Dressing/Wound Instructions: Not applicable  4. Lifting & Weight-bearing Instructions: No restrictions  5. Diet: Return to previous diet  6. Miscellaneous:     Contact your PCP office and schedule a hospital follow-up appointment within 1 week after discharge from [facility].  Per Urology, contact Dr. Lamb's office for stone removal outpatient.    If you have any questions 24-48 hours after discharge, please feel free to contact the hospital unit/department you were discharged from.      Your discharge plan includes access to our free Care Transitions program.     As your Care Transition Nurse I will contact you via phone within 2 business days after your discharge from the hospital. Please have your discharge instructions and medications ready for review. Over the next 30 days I will call you at least once a week. I am able to assist with arranging follow-up appointments. I have direct contact with your physicians and will alert them with any health or medication concerns or relay your questions.     Your Care Transitions Nurse is iBsi Rider RN.    If you have any questions or concerns after your discharge and prior to our first call, you can reach me at [phone].      Ureteral Stents  A ureteral stent is a soft plastic tube with holes in it. It’s temporarily inserted into a ureter to help drain urine  Some thicking of the maxillary sinus on the right over an infected tooth.  Recommend dental consultation.  Otherwise can proceed with a septoplasty and coblation of the bilateral inferior turbinates.  Possible thinning over the semicircular canals.  If dizziness with loud noise can check further. into the bladder. One end goes in the kidney. The other end goes in the bladder. A coil on each end holds the stent in place. The stent can’t be seen from outside the body. It shouldn’t interfere with your normal routine. Your stent will be put in by a doctor trained in treating the urinary tract (a urologist) or another specialist. The procedure is done in a hospital or surgery center. You’ll likely go home the same day.    You had a ureteral stent placed during surgery.  This was done by Urology, to help your surgeon identify and decrease the risk of injury to the ureter. This is NOT permanent and will need to be removed in the next 4-6 weeks.    Common side effects or symptoms with stents are often self limiting. There are medications that can be used to help with any of these symptoms or side effects- Just ask your urologist if symptoms are bothersome.  Common symptoms include:  Blood in the urine  This is typically self limiting and resolves over 1-3 days. You may experience intermittent blood in your urine while stents are in place.  Increase fluid intake to keep urine clear  Ureteral colic  The ureter is made of smooth muscle and moves urine from the kidney to the bladder by peristalsis. This is the muscles mina to push urine down the tube.  While the stent is in place, sometimes the ureters will try to \"push\" the stent out.   The coils in the stent prevent this from happening. Occasionally people describe this feeling as a cramping sensation in the abdomen or back.  Urinary frequency or burning with urination.  Some people may experience the urge to urinate more frequently. This is due to the stent. The portion that coils in the bladder, may \"tickle\" the bladder wall, resulting in more frequent urges to urinate.  Some people experience burning with urination or a sharp/shooting pain at the end of urination.      When is a ureteral stent used?  A ureteral stent may be used:  To bypass a blockage in a  kidney or ureter.  During kidney stone removal.  To let a ureter heal after surgery.  To help your surgeon identify the ureter and decrease risk of ureteral injury      During the procedure  The doctor inserts a cystoscope (lighted instrument) through the urethra and into the bladder. This shows the opening to the ureter.  A thin wire is carefully threaded through the cystoscope, up the ureter, and into the kidney. The stent is inserted over the wire.  A fluoroscope (special X-ray machine) is used to help position the stent. When the stent is in place, the wire and cystoscope are removed.  While you have a stent  Some discomfort is normal. Certain movements may trigger pain or a feeling that you need to urinate. You may also feel mild soreness or pressure before or during urination. These symptoms will go away a few days after the stent is removed.  Medicine to control pain or bladder spasms or to prevent infection may be prescribed. Take this as directed.  Drink plenty of fluids to help flush out your urinary tract.  Your urine may be slightly pink or red. This is due to bleeding caused by minor irritation from the stent. This may happen on and off while you have the stent.  As with any synthetic device placed in the body, there is a risk of infection. The stent may have to be removed if this happens.   How long will you need a stent?  The stent is often taken out after 4-6 weeks following surgery. May be removed sooner at the discretion of the Urologist.  When to call your healthcare provider  Contact your healthcare provider right away if:  Your urine contains blood clots or you see a large amount of blood-tinged urine  You have symptoms similar to those you had before the stent was placed  You constantly leak urine  You have a fever over 100.4°F (38°C), chills, nausea, or vomiting  Your pain is not relieved with medicine  The end of the stent comes out of the urethra   Date Last Reviewed: 1/1/2017  © 6035-7714  [employer]. [address] [place] [address], [place] [address]. All rights reserved. This information is not intended as a substitute for professional medical care. Always follow your healthcare professional's instructions.

## 2024-07-10 ENCOUNTER — TELEPHONE (OUTPATIENT)
Dept: OTOLARYNGOLOGY | Facility: CLINIC | Age: 44
End: 2024-07-10

## 2024-07-10 DIAGNOSIS — J34.2 NASAL SEPTAL DEVIATION: Primary | ICD-10-CM

## 2024-07-10 DIAGNOSIS — J34.3 HYPERTROPHY OF NASAL TURBINATES: ICD-10-CM

## 2024-07-10 NOTE — PROGRESS NOTES
Patient scheduled for NASAL SEPTOPLASTY   BILATERAL COBLATION OF INFERIOR TURBINATES on 7/31/24.

## 2024-07-10 NOTE — TELEPHONE ENCOUNTER
Patient scheduled for NASAL SEPTOPLASTY   BILATERAL COBLATION OF INFERIOR TURBINATES on 7/31/24.     Spoke with patient regarding insurance, patient states he does have active surgery. Will have  update insurance for patient.

## 2024-07-10 NOTE — PROGRESS NOTES
Spoke with the patient.  No dizziness to sound.  Will proceed with septoplasty and coblation of the inferior turbinates.

## 2024-07-12 NOTE — TELEPHONE ENCOUNTER
Pt had a missed call.  No message was left. Pt's dentist will be back on Monday 7-15-24.  Please call pt

## 2024-07-13 ENCOUNTER — LAB ENCOUNTER (OUTPATIENT)
Dept: LAB | Age: 44
End: 2024-07-13
Attending: INTERNAL MEDICINE
Payer: COMMERCIAL

## 2024-07-13 DIAGNOSIS — G25.81 RLS (RESTLESS LEGS SYNDROME): ICD-10-CM

## 2024-07-13 DIAGNOSIS — E78.5 DYSLIPIDEMIA: ICD-10-CM

## 2024-07-13 LAB
CHOLEST SERPL-MCNC: 180 MG/DL (ref ?–200)
DEPRECATED HBV CORE AB SER IA-ACNC: 35.3 NG/ML
FASTING PATIENT LIPID ANSWER: YES
HDLC SERPL-MCNC: 40 MG/DL (ref 40–59)
LDLC SERPL CALC-MCNC: 121 MG/DL (ref ?–100)
NONHDLC SERPL-MCNC: 140 MG/DL (ref ?–130)
TRIGL SERPL-MCNC: 106 MG/DL (ref 30–149)
VLDLC SERPL CALC-MCNC: 19 MG/DL (ref 0–30)

## 2024-07-13 PROCEDURE — 80061 LIPID PANEL: CPT

## 2024-07-13 PROCEDURE — 36415 COLL VENOUS BLD VENIPUNCTURE: CPT

## 2024-07-13 PROCEDURE — 82728 ASSAY OF FERRITIN: CPT

## 2024-07-15 NOTE — TELEPHONE ENCOUNTER
Confirmed with Dr. Dick. Patient does not need dental clearance prior to surgery. Patient is aware

## 2024-07-16 ENCOUNTER — TELEPHONE (OUTPATIENT)
Dept: PULMONOLOGY | Facility: CLINIC | Age: 44
End: 2024-07-16

## 2024-07-16 DIAGNOSIS — G25.81 RLS (RESTLESS LEGS SYNDROME): Primary | ICD-10-CM

## 2024-07-16 NOTE — TELEPHONE ENCOUNTER
----- Message from Bobby Dick sent at 7/15/2024  8:39 PM CDT -----  RN, I spoke with the patient regarding his blood test.  Please add to the calendar a repeat ferritin as well as an iron, TIBC and CBC at the 3-month interval.  The patient is going to start supplemental iron for his restless leg syndrome.  Additionally, please send him a copy of the heart healthy diet.  I also tried to reach out to his brother Momo at [phone] to review the findings with him as well.  Babak CORRALES

## 2024-07-22 ENCOUNTER — TELEPHONE (OUTPATIENT)
Dept: OTOLARYNGOLOGY | Facility: CLINIC | Age: 44
End: 2024-07-22

## 2024-07-23 RX ORDER — DOXYCYCLINE HYCLATE 50 MG/1
325 CAPSULE, GELATIN COATED ORAL
COMMUNITY
End: 2024-07-31

## 2024-07-27 NOTE — H&P
[facility]  part of [facility]    History and Physical    Noel Chen Patient Status:  Hospital Outpatient Surgery    1980 MRN [id number]   Location [facility] OPERATING ROOM Attending Xiomara Dick MD   Hosp Day # 0 PCP Bobby Dick MD     Date:  2024  Date of Admission:  (Not on file)    History provided by:patient  HPI:   No chief complaint on file.    HPI  Nasal obstruction left greater than right despite antihistamines, decongestants, and nasal steroid sprays    History     Past Medical History:    Anxiety    Depression    History of brain shunt    Hx of motion sickness    Sleep apnea    doesn't use CPAP     Past Surgical History:   Procedure Laterality Date    Appendectomy      Brain surgery      brain shunt at birth     Family History   Problem Relation Age of Onset    Cancer Father     Diabetes Mother     Heart Disorder Mother      Social History:  Social History     Socioeconomic History    Marital status: Single   Tobacco Use    Smoking status: Never    Smokeless tobacco: Never   Vaping Use    Vaping status: Never Used   Substance and Sexual Activity    Alcohol use: No    Drug use: No   Other Topics Concern    Caffeine Concern Yes     Comment: 1 cup    Pt has a pacemaker No    Reaction to local anesthetic No   Social History Narrative    ** Merged History Encounter **          Allergies/Medications:   Allergies:   Allergies   Allergen Reactions    Amoxicillin HIVES     Denies blisters, skin peeling or organ damage    Penicillins SWELLING     Denies blisters, skin peeling or organ damage    Wasps ANAPHYLAXIS    Ceclor [Cefaclor]     Penicillins     Amoxicillin RASH     Patient dont know     Peg-8 Beeswax [Polyethylene Glycol] RASH   Medications: ropinirole        Review of Systems:   Review of Systems  Nasal obstruction left greater than right.  Inability tolerating CPAP mask with nasal obstruction    Physical Exam:   Vital Signs:  Height 5' 6\" (1.676 m),  weight 205 lb (93 kg).  Physical Exam  Ears: normal  Nose: left greater than right septal deviation with inferior septal spur and turbinate congestion  Mouth: normal  Neck: no adenopathy  Lungs: clear  Heart: regular rate and rhythm.  No murmurs      Results:     Lab Results   Component Value Date    WBC 8.0 04/14/2023    HGB 15.1 04/14/2023    HCT 45.0 04/14/2023    .0 04/14/2023    CREATSERUM 0.80 10/24/2022    BUN 19 (H) 10/24/2022     10/24/2022    K 3.8 10/24/2022     10/24/2022    CO2 31.0 10/24/2022     (H) 10/24/2022    CA 8.1 (L) 10/24/2022    ALB 3.9 10/20/2022    ALKPHO 89 10/20/2022    BILT 0.9 10/20/2022    TP 7.7 10/20/2022    AST 26 10/20/2022    ALT 37 10/20/2022    TSH 1.420 04/14/2023     10/20/2022     No results found.        Assessment/Plan:     Impression: septal deviation left greater than right and turbinate congestion with nasal obstruction despite antihistamines, decongestants, and nasal steroid sprays  Plan: septoplasty and coblation of the bilateral inferior turbinates.              Xiomara Dick MD  7/27/2024

## 2024-07-30 NOTE — DISCHARGE INSTRUCTIONS
HOME INSTRUCTIONS  [facility] HOME CARE INSTRUCTIONS: POST-OP ANESTHESIA  The medication that you received for sedation or general anesthesia can last up to 24 hours. Your judgment and reflexes may be altered, even if you feel like your normal self.      We Recommend:   Do not drive any motor vehicle or bicycle   Avoid mowing the lawn, playing sports, or working with power tools/applicances (power saws, electric knives or mixers)   That you have someone stay with you on your first night home   Do not drink alcohol or take sleeping pills or tranquilizers   Do not sign legal documents within 24 hours of your procedure   If you had a nerve block for your surgery, take extra care not to put any pressure on your arm or hand for 24 hours    It is normal:  For you to have a sore throat if you had a breathing tube during surgery (while you were asleep!). The sore throat should get better within 48 hours. You can gargle with warm salt water (1/2 tsp in 4 oz warm water) or use a throat lozenge for comfort  To feel muscle aches or soreness especially in the abdomen, chest or neck. The achy feeling should go away in the next 24 hours  To feel weak, sleepy or \"wiped out\". Your should start feeling better in the next 24 hours.   To experience mild discomforts such as sore lip or tongue, headache, cramps, gas pains or a bloated feeling in your abdomen.   To experience mild back pain or soreness for a day or two if you had spinal or epidural anesthesia.   If you had laparoscopic surgery, to feel shoulder pain or discomfort on the day of surgery.   For some patients to have nausea after surgery/anesthesia    If you feel nausea or experience vomiting:   Try to move around less.   Eat less than usual or drink only liquids until the next morning   Nausea should resolve in about 24 hours    If you have a problem when you are at home:    Call your surgeons office   Discharge Instructions: After Your Surgery  You’ve just had surgery. During  surgery, you were given medicine called anesthesia to keep you relaxed and free of pain. After surgery, you may have some pain or nausea. This is common. Here are some tips for feeling better and getting well after surgery.   Going home  Your healthcare provider will show you how to take care of yourself when you go home. They'll also answer your questions. Have an adult family member or friend drive you home. For the first 24 hours after your surgery:   Don't drive or use heavy equipment.  Don't make important decisions or sign legal papers.  Take medicines as directed.  Don't drink alcohol.  Have someone stay with you, if needed. They can watch for problems and help keep you safe.  Be sure to go to all follow-up visits with your healthcare provider. And rest after your surgery for as long as your provider tells you to.   Coping with pain  If you have pain after surgery, pain medicine will help you feel better. Take it as directed, before pain becomes severe. Also, ask your healthcare provider or pharmacist about other ways to control pain. This might be with heat, ice, or relaxation. And follow any other instructions your surgeon or nurse gives you.      Stay on schedule with your medicine.     Tips for taking pain medicine  To get the best relief possible, remember these points:   Pain medicines can upset your stomach. Taking them with a little food may help.  Most pain relievers taken by mouth need at least 20 to 30 minutes to start to work.  Don't wait till your pain becomes severe before you take your medicine. Try to time your medicine so that you can take it before starting an activity. This might be before you get dressed, go for a walk, or sit down for dinner.  Constipation is a common side effect of some pain medicines. Call your healthcare provider before taking any medicines such as laxatives or stool softeners to help ease constipation. Also ask if you should skip any foods. Drinking lots of fluids and  eating foods such as fruits and vegetables that are high in fiber can also help. Remember, don't take laxatives unless your surgeon has prescribed them.  Drinking alcohol and taking pain medicine can cause dizziness and slow your breathing. It can even be deadly. Don't drink alcohol while taking pain medicine.  Pain medicine can make you react more slowly to things. Don't drive or run machinery while taking pain medicine.  Your healthcare provider may tell you to take acetaminophen to help ease your pain. Ask them how much you're supposed to take each day. Acetaminophen or other pain relievers may interact with your prescription medicines or other over-the-counter (OTC) medicines. Some prescription medicines have acetaminophen and other ingredients in them. Using both prescription and OTC acetaminophen for pain can cause you to accidentally overdose. Read the labels on your OTC medicines with care. This will help you to clearly know the list of ingredients, how much to take, and any warnings. It may also help you not take too much acetaminophen. If you have questions or don't understand the information, ask your pharmacist or healthcare provider to explain it to you before you take the OTC medicine.   Managing nausea  Some people have an upset stomach (nausea) after surgery. This is often because of anesthesia, pain, or pain medicine, less movement of food in the stomach, or the stress of surgery. These tips will help you handle nausea and eat healthy foods as you get better. If you were on a special food plan before surgery, ask your healthcare provider if you should follow it while you get better. Check with your provider on how your eating should progress. It may depend on the surgery you had. These general tips may help:   Don't push yourself to eat. Your body will tell you when to eat and how much.  Start off with clear liquids and soup. They're easier to digest.  Next try semi-solid foods as you feel ready.  These include mashed potatoes, applesauce, and gelatin.  Slowly move to solid foods. Don’t eat fatty, rich, or spicy foods at first.  Don't force yourself to have 3 large meals a day. Instead eat smaller amounts more often.  Take pain medicines with a small amount of solid food, such as crackers or toast. This helps prevent nausea.  When to call your healthcare provider  Call your healthcare provider right away if you have any of these:   You still have too much pain, or the pain gets worse, after taking the medicine. The medicine may not be strong enough. Or there may be a complication from the surgery.  You feel too sleepy, dizzy, or groggy. The medicine may be too strong.  Side effects such as nausea or vomiting. Your healthcare provider may advise taking other medicines to .  Skin changes such as rash, itching, or hives. This may mean you have an allergic reaction. Your provider may advise taking other medicines.  The incision looks different (for instance, part of it opens up).  Bleeding or fluid leaking from the incision site, and weren't told to expect that.  Fever of 100.4°F (38°C) or higher, or as directed by your provider.  Call 911  Call 911 right away if you have:   Trouble breathing  Facial swelling    If you have obstructive sleep apnea   You were given anesthesia medicine during surgery to keep you comfortable and free of pain. After surgery, you may have more apnea spells because of this medicine and other medicines you were given. The spells may last longer than normal.    At home:  Keep using the continuous positive airway pressure (CPAP) device when you sleep. Unless your healthcare provider tells you not to, use it when you sleep, day or night. CPAP is a common device used to treat obstructive sleep apnea.  Talk with your provider before taking any pain medicine, muscle relaxants, or sedatives. Your provider will tell you about the possible dangers of taking these medicines.  Contact your  provider if your sleeping changes a lot even when taking medicines as directed.  Yelena last reviewed this educational content on 10/1/2021  © 3557-5742 [employer]. All rights reserved. This information is not intended as a substitute for professional medical care. Always follow your healthcare professional's instructions.      DISCHARGE INSTRUCTIONS    Elevate head of bed  No aspirin, advil, aleve, ibuprofen, or motrin  Follow up in [place] office tomorrow at 9:50 am.  Eat and take pain medication prior to visit  Change mustache dressing as needed  Tylenol or norco for pain  Prescriptions = norco, and zithromycin  Call with any problems.

## 2024-07-31 ENCOUNTER — ANESTHESIA (OUTPATIENT)
Dept: SURGERY | Facility: HOSPITAL | Age: 44
End: 2024-07-31
Payer: COMMERCIAL

## 2024-07-31 ENCOUNTER — HOSPITAL ENCOUNTER (OUTPATIENT)
Facility: HOSPITAL | Age: 44
Setting detail: HOSPITAL OUTPATIENT SURGERY
Discharge: HOME | End: 2024-07-31
Attending: SPECIALIST | Admitting: SPECIALIST
Payer: COMMERCIAL

## 2024-07-31 ENCOUNTER — ANESTHESIA EVENT (OUTPATIENT)
Dept: SURGERY | Facility: HOSPITAL | Age: 44
End: 2024-07-31
Payer: COMMERCIAL

## 2024-07-31 VITALS
TEMPERATURE: 99 F | HEART RATE: 80 BPM | WEIGHT: 196.13 LBS | BODY MASS INDEX: 31.52 KG/M2 | SYSTOLIC BLOOD PRESSURE: 131 MMHG | DIASTOLIC BLOOD PRESSURE: 89 MMHG | OXYGEN SATURATION: 96 % | RESPIRATION RATE: 16 BRPM | HEIGHT: 66 IN

## 2024-07-31 DIAGNOSIS — J34.2 NASAL SEPTAL DEVIATION: Primary | ICD-10-CM

## 2024-07-31 PROCEDURE — 095L0ZZ DESTRUCTION OF NASAL TURBINATE, OPEN APPROACH: ICD-10-PCS | Performed by: SPECIALIST

## 2024-07-31 PROCEDURE — 09SM0ZZ REPOSITION NASAL SEPTUM, OPEN APPROACH: ICD-10-PCS | Performed by: SPECIALIST

## 2024-07-31 RX ORDER — FAMOTIDINE 20 MG/1
20 TABLET, FILM COATED ORAL ONCE
Status: COMPLETED | OUTPATIENT
Start: 2024-07-31 | End: 2024-07-31

## 2024-07-31 RX ORDER — DEXAMETHASONE SODIUM PHOSPHATE 4 MG/ML
VIAL (ML) INJECTION AS NEEDED
Status: DISCONTINUED | OUTPATIENT
Start: 2024-07-31 | End: 2024-07-31 | Stop reason: SURG

## 2024-07-31 RX ORDER — HYDROCODONE BITARTRATE AND ACETAMINOPHEN 10; 325 MG/1; MG/1
1 TABLET ORAL EVERY 4 HOURS PRN
Qty: 20 TABLET | Refills: 0 | Status: SHIPPED | OUTPATIENT
Start: 2024-07-31

## 2024-07-31 RX ORDER — METOCLOPRAMIDE HYDROCHLORIDE 5 MG/ML
10 INJECTION INTRAMUSCULAR; INTRAVENOUS ONCE
Status: COMPLETED | OUTPATIENT
Start: 2024-07-31 | End: 2024-07-31

## 2024-07-31 RX ORDER — PHENYLEPHRINE HCL 10 MG/ML
VIAL (ML) INJECTION AS NEEDED
Status: DISCONTINUED | OUTPATIENT
Start: 2024-07-31 | End: 2024-07-31 | Stop reason: SURG

## 2024-07-31 RX ORDER — LIDOCAINE HYDROCHLORIDE AND EPINEPHRINE 10; 10 MG/ML; UG/ML
INJECTION, SOLUTION INFILTRATION; PERINEURAL AS NEEDED
Status: DISCONTINUED | OUTPATIENT
Start: 2024-07-31 | End: 2024-07-31 | Stop reason: HOSPADM

## 2024-07-31 RX ORDER — EPHEDRINE SULFATE 50 MG/ML
INJECTION, SOLUTION INTRAVENOUS AS NEEDED
Status: DISCONTINUED | OUTPATIENT
Start: 2024-07-31 | End: 2024-07-31 | Stop reason: SURG

## 2024-07-31 RX ORDER — AZITHROMYCIN 250 MG/1
TABLET, FILM COATED ORAL
Qty: 6 TABLET | Refills: 0 | Status: SHIPPED | OUTPATIENT
Start: 2024-07-31

## 2024-07-31 RX ORDER — HYDROMORPHONE HYDROCHLORIDE 1 MG/ML
0.6 INJECTION, SOLUTION INTRAMUSCULAR; INTRAVENOUS; SUBCUTANEOUS EVERY 5 MIN PRN
Status: DISCONTINUED | OUTPATIENT
Start: 2024-07-31 | End: 2024-07-31

## 2024-07-31 RX ORDER — LIDOCAINE HYDROCHLORIDE 10 MG/ML
INJECTION, SOLUTION EPIDURAL; INFILTRATION; INTRACAUDAL; PERINEURAL AS NEEDED
Status: DISCONTINUED | OUTPATIENT
Start: 2024-07-31 | End: 2024-07-31 | Stop reason: SURG

## 2024-07-31 RX ORDER — METOCLOPRAMIDE 10 MG/1
10 TABLET ORAL ONCE
Status: COMPLETED | OUTPATIENT
Start: 2024-07-31 | End: 2024-07-31

## 2024-07-31 RX ORDER — MORPHINE SULFATE 4 MG/ML
4 INJECTION, SOLUTION INTRAMUSCULAR; INTRAVENOUS EVERY 10 MIN PRN
Status: DISCONTINUED | OUTPATIENT
Start: 2024-07-31 | End: 2024-07-31

## 2024-07-31 RX ORDER — NALOXONE HYDROCHLORIDE 0.4 MG/ML
80 INJECTION, SOLUTION INTRAMUSCULAR; INTRAVENOUS; SUBCUTANEOUS AS NEEDED
Status: DISCONTINUED | OUTPATIENT
Start: 2024-07-31 | End: 2024-07-31

## 2024-07-31 RX ORDER — SODIUM CHLORIDE, SODIUM LACTATE, POTASSIUM CHLORIDE, CALCIUM CHLORIDE 600; 310; 30; 20 MG/100ML; MG/100ML; MG/100ML; MG/100ML
INJECTION, SOLUTION INTRAVENOUS CONTINUOUS
Status: DISCONTINUED | OUTPATIENT
Start: 2024-07-31 | End: 2024-07-31

## 2024-07-31 RX ORDER — HYDROMORPHONE HYDROCHLORIDE 1 MG/ML
0.2 INJECTION, SOLUTION INTRAMUSCULAR; INTRAVENOUS; SUBCUTANEOUS EVERY 5 MIN PRN
Status: DISCONTINUED | OUTPATIENT
Start: 2024-07-31 | End: 2024-07-31

## 2024-07-31 RX ORDER — ONDANSETRON 2 MG/ML
INJECTION INTRAMUSCULAR; INTRAVENOUS AS NEEDED
Status: DISCONTINUED | OUTPATIENT
Start: 2024-07-31 | End: 2024-07-31 | Stop reason: SURG

## 2024-07-31 RX ORDER — GLYCOPYRROLATE 0.2 MG/ML
INJECTION, SOLUTION INTRAMUSCULAR; INTRAVENOUS AS NEEDED
Status: DISCONTINUED | OUTPATIENT
Start: 2024-07-31 | End: 2024-07-31 | Stop reason: SURG

## 2024-07-31 RX ORDER — ROCURONIUM BROMIDE 10 MG/ML
INJECTION, SOLUTION INTRAVENOUS AS NEEDED
Status: DISCONTINUED | OUTPATIENT
Start: 2024-07-31 | End: 2024-07-31 | Stop reason: SURG

## 2024-07-31 RX ORDER — FAMOTIDINE 10 MG/ML
20 INJECTION, SOLUTION INTRAVENOUS ONCE
Status: COMPLETED | OUTPATIENT
Start: 2024-07-31 | End: 2024-07-31

## 2024-07-31 RX ORDER — MORPHINE SULFATE 4 MG/ML
2 INJECTION, SOLUTION INTRAMUSCULAR; INTRAVENOUS EVERY 10 MIN PRN
Status: DISCONTINUED | OUTPATIENT
Start: 2024-07-31 | End: 2024-07-31

## 2024-07-31 RX ORDER — ACETAMINOPHEN 500 MG
1000 TABLET ORAL ONCE
Status: COMPLETED | OUTPATIENT
Start: 2024-07-31 | End: 2024-07-31

## 2024-07-31 RX ORDER — HYDROMORPHONE HYDROCHLORIDE 1 MG/ML
0.4 INJECTION, SOLUTION INTRAMUSCULAR; INTRAVENOUS; SUBCUTANEOUS EVERY 5 MIN PRN
Status: DISCONTINUED | OUTPATIENT
Start: 2024-07-31 | End: 2024-07-31

## 2024-07-31 RX ORDER — MORPHINE SULFATE 10 MG/ML
6 INJECTION, SOLUTION INTRAMUSCULAR; INTRAVENOUS EVERY 10 MIN PRN
Status: DISCONTINUED | OUTPATIENT
Start: 2024-07-31 | End: 2024-07-31

## 2024-07-31 RX ADMIN — GLYCOPYRROLATE 0.2 MG: 0.2 INJECTION, SOLUTION INTRAMUSCULAR; INTRAVENOUS at 07:22:00

## 2024-07-31 RX ADMIN — ONDANSETRON 4 MG: 2 INJECTION INTRAMUSCULAR; INTRAVENOUS at 07:30:00

## 2024-07-31 RX ADMIN — LIDOCAINE HYDROCHLORIDE 50 MG: 10 INJECTION, SOLUTION EPIDURAL; INFILTRATION; INTRACAUDAL; PERINEURAL at 07:21:00

## 2024-07-31 RX ADMIN — PHENYLEPHRINE HCL 100 MCG: 10 MG/ML VIAL (ML) INJECTION at 07:39:00

## 2024-07-31 RX ADMIN — DEXAMETHASONE SODIUM PHOSPHATE 4 MG: 4 MG/ML VIAL (ML) INJECTION at 07:30:00

## 2024-07-31 RX ADMIN — PHENYLEPHRINE HCL 100 MCG: 10 MG/ML VIAL (ML) INJECTION at 08:05:00

## 2024-07-31 RX ADMIN — ROCURONIUM BROMIDE 50 MG: 10 INJECTION, SOLUTION INTRAVENOUS at 07:21:00

## 2024-07-31 RX ADMIN — EPHEDRINE SULFATE 5 MG: 50 INJECTION, SOLUTION INTRAVENOUS at 08:05:00

## 2024-07-31 RX ADMIN — EPHEDRINE SULFATE 10 MG: 50 INJECTION, SOLUTION INTRAVENOUS at 07:39:00

## 2024-07-31 NOTE — OPERATIVE REPORT
[facility]    PATIENT'S NAME: AMEE KANG   ATTENDING PHYSICIAN: Xiomara Dick MD   OPERATING PHYSICIAN: Xiomara Dick MD   PATIENT ACCOUNT#:   [id number]    LOCATION:  [facility] PACU [address] [address]  MEDICAL RECORD #:   [id number]       YOB: 1980  ADMISSION DATE:       07/31/2024      OPERATION DATE:  07/31/2024    OPERATIVE REPORT      PREOPERATIVE DIAGNOSIS:  Bilateral septal deviation and turbinate congestion with nasal obstruction.  POSTOPERATIVE DIAGNOSIS:  Bilateral septal deviation and turbinate congestion with nasal obstruction.  PROCEDURE:  Septoplasty and Coblation of bilateral inferior turbinates.    ANESTHESIA:  General by oral endotracheal tube.    ESTIMATED BLOOD LOSS:  15 mL.    COMPLICATIONS:  None.    SPECIMENS:  Septal cartilage and bone.     INDICATIONS:  This is a 43-year-old male who tried antihistamines, decongestants, and nasal steroid sprays without improvement of his nasal obstruction.  For the above-mentioned reason, the procedure was indicated.     OPERATIVE TECHNIQUE:  The patient was taken to the operating room suite, placed under general anesthesia and an oral endotracheal tube was placed.  A total of 6 mL of 1% lidocaine with 1:1100,000 epinephrine was infiltrated into the bilateral nasal septum.  A total of 4 mL of 4% cocaine was placed on a total of 6 cotton pledgets to the bilateral nares.  A left hemitransfixion incision was made.  The mucoperichondrial and mucoperiosteal flaps were elevated.  An inferior tunnel was also made on the left-hand side.  The crooked pieces of cartilage and bone were removed anteriorly with a Tevin forceps and posteriorly with a Raul-Suman double-action rongeur.  A chisel and mallet were also used to remove some of the inferior septal spur.  After this was done, the hemitransfixion incision was closed with two 4- chromic sutures.  A silastic splint was placed and secured with a 4-0 Prolene suture.  Next, attention  was directed to the bilateral inferior turbinates.  Three passes were made at a Coblation of 10.  After this was done, the nose and mouth were fully suctioned.  The standard Merocel packing was placed.  The stomach was fully suctioned.  The patient was extubated in the operating room suite and taken to the recovery room in good condition.    Dictated By Xiomara Dick MD  d: 07/31/2024 08:42:04  t: 07/31/2024 09:52:37  [facility] [id number]  [facility] [place]/

## 2024-07-31 NOTE — ANESTHESIA POSTPROCEDURE EVALUATION
Patient: Noel Chen    Procedure Summary       Date: 07/31/24 Room / Location: [facility] MAIN OR 03 / [facility] MAIN OR    Anesthesia Start: 0719 Anesthesia Stop: 0844    Procedure: Nasal septoplasty, bilateral coblation of inferior turbinates (Bilateral: Nose) Diagnosis:       Nasal septal deviation      Hypertrophy of nasal turbinates      (Nasal septal deviation [J34.2]Hypertrophy of nasal turbinates [J34.3])    Surgeons: Xiomraa Dick MD Anesthesiologist: Sultana uCellar MD    Anesthesia Type: general ASA Status: 2            Anesthesia Type: general    Vitals Value Taken Time   /93 07/31/24 0902   Temp 98 °F (36.7 °C) 07/31/24 0852   Pulse 84 07/31/24 0908   Resp 16 07/31/24 0908   SpO2 98 % 07/31/24 0908   Vitals shown include unfiled device data.    EM AN Post Evaluation:   Patient Evaluated in PACU  Patient Participation: complete - patient participated  Level of Consciousness: awake  Pain Score: 0  Pain Management: adequate  Airway Patency:patent  Yes    Cardiovascular Status: acceptable  Respiratory Status: acceptable  Postoperative Hydration acceptable    Leda Vázquez, CRNA  7/31/2024 9:09 AM

## 2024-07-31 NOTE — ANESTHESIA PROCEDURE NOTES
Airway  Date/Time: 7/31/2024 7:30 AM  Urgency: elective    Airway not difficult    General Information and Staff    Patient location during procedure: OR  Anesthesiologist: Sultana Cuellar MD  Resident/CRNA: Leda Vázquez CRNA  Performed: CRNA   Performed by: Leda Vázquez CRNA  Authorized by: Sultana Cuellar MD      Indications and Patient Condition  Indications for airway management: anesthesia  Spontaneous Ventilation: absent  Sedation level: deep  Preoxygenated: yes  Patient position: sniffing  MILS not maintained throughout  Mask difficulty assessment: 1 - vent by mask  No planned trial extubation    Final Airway Details  Final airway type: endotracheal airway      Successful airway: ETT  Cuffed: yes   Successful intubation technique: Video laryngoscopy  Facilitating devices/methods: intubating stylet  Endotracheal tube insertion site: oral  Blade type: tyson.  Blade size: #3  ETT size (mm): 7.5    Cormack-Lehane Classification: grade I - full view of glottis  Placement verified by: capnometry   Measured from: lips  Number of attempts at approach: 1    Additional Comments  Smooth induction, smooth intubation

## 2024-07-31 NOTE — ADDENDUM NOTE
Addendum  created 07/31/24 0915 by Leda Vázquez CRNA    Intraprocedure Event edited, Intraprocedure Staff edited

## 2024-07-31 NOTE — INTERVAL H&P NOTE
Pre-op Diagnosis: Nasal septal deviation [J34.2]  Hypertrophy of nasal turbinates [J34.3]    The above referenced H&P was reviewed by Xiomara Dick MD on 7/31/2024, the patient was examined and no significant changes have occurred in the patient's condition since the H&P was performed.  I discussed with the patient and/or legal representative the potential benefits, risks and side effects of this procedure; the likelihood of the patient achieving goals; and potential problems that might occur during recuperation.  I discussed reasonable alternatives to the procedure, including risks, benefits and side effects related to the alternatives and risks related to not receiving this procedure.  We will proceed with procedure as planned.

## 2024-07-31 NOTE — ANESTHESIA PREPROCEDURE EVALUATION
Anesthesia PreOp Note    HPI:     Noel Chen is a 43 year old male who presents for preoperative consultation requested by: Xiomara Dick MD    Date of Surgery: 7/31/2024    Procedure(s):  Nasal septoplasty, bilateral coblation of inferior turbinates  Indication: Nasal septal deviation [J34.2]  Hypertrophy of nasal turbinates [J34.3]    Relevant Problems   No relevant active problems       NPO:  Last Liquid Consumption Date: 07/30/24  Last Liquid Consumption Time: 2200  Last Solid Consumption Date: 07/30/24  Last Solid Consumption Time: 2200  Last Liquid Consumption Date: 07/30/24          History Review:  Patient Active Problem List    Diagnosis Date Noted    Other fatigue 02/27/2023    Postoperative examination 11/03/2022    Pneumonia due to infectious organism     Acute appendicitis with localized peritonitis without perforation, unspecified whether abscess present, unspecified whether gangrene present 10/21/2022    Acute appendicitis with localized peritonitis without perforation 10/20/2022    Anxiety 08/22/2022    Anger 10/02/2017    Hydrocephaly (HCC) 10/03/2016    Bronchitis 12/14/2015       Past Medical History:    Anxiety    Depression    History of brain shunt    Hx of motion sickness    Sleep apnea    doesn't use CPAP       Past Surgical History:   Procedure Laterality Date    Appendectomy      Brain surgery      brain shunt at birth       Medications Prior to Admission   Medication Sig Dispense Refill Last Dose    Ferrous Gluconate 324 (38 Fe) MG Oral Tab Take 1 tablet (325 mg total) by mouth daily with breakfast.   7/29/2024    atorvastatin 10 MG Oral Tab Take 1 tablet (10 mg total) by mouth every evening. 30 tablet 5 7/30/2024 at 1400    rOPINIRole 0.5 MG Oral Tab Take 1 tablet (0.5 mg total) by mouth nightly. 30 tablet 5 7/30/2024 at 1500    EPINEPHRINE 0.3 MG/0.3ML Injection Solution Auto-injector USE AS DIRECTED 1 each 1     ARIPiprazole 5 MG Oral Tab Take 1 tablet (5 mg total) by mouth  nightly.   7/30/2024 at 1400    ALPRAZOLAM 1 MG Oral Tab TAKE 1 TABLET BY MOUTH EVERY 6 HOURS AS NEEDED 30 tablet 0 2/29/2024    acetaminophen 325 MG Oral Tab Take 2 tablets (650 mg total) by mouth every 6 (six) hours as needed.  0 7/29/2024    HYDROcodone-acetaminophen 5-325 MG Oral Tab Take 1 tablet by mouth every 6 (six) hours as needed for Pain. 30 tablet 0 Past Month    ibuprofen 600 MG Oral Tab Take 1 tablet (600 mg total) by mouth every 6 hours with food (Patient taking differently: Take 1 tablet (600 mg total) by mouth every 6 (six) hours as needed for Pain. Take 1 tablet (600 mg total) by mouth every 6 hours with food) 20 tablet 0 7/25/2024     Current Facility-Administered Medications Ordered in Epic   Medication Dose Route Frequency Provider Last Rate Last Admin    lactated ringers infusion   Intravenous Continuous Xiomara Dick MD 20 mL/hr at 07/31/24 0628 New Bag at 07/31/24 0628     No current [facility]-ordered outpatient medications on file.       Allergies   Allergen Reactions    Amoxicillin HIVES     Denies blisters, skin peeling or organ damage    Bee Venom ANAPHYLAXIS    Penicillins SWELLING     Denies blisters, skin peeling or organ damage    Wasps ANAPHYLAXIS    Ceclor [Cefaclor]     Penicillins     Amoxicillin RASH     Patient dont know     Peg-8 Beeswax [Polyethylene Glycol] RASH       Family History   Problem Relation Age of Onset    Cancer Father     Diabetes Mother     Heart Disorder Mother      Social History     Socioeconomic History    Marital status: Single   Tobacco Use    Smoking status: Never    Smokeless tobacco: Never   Vaping Use    Vaping status: Never Used   Substance and Sexual Activity    Alcohol use: No    Drug use: No   Other Topics Concern    Caffeine Concern Yes     Comment: 1 cup    Pt has a pacemaker No    Reaction to local anesthetic No       Available pre-op labs reviewed.             Vital Signs:  Body mass index is 31.65 kg/m².   height is 1.676 m (5' 6\") and weight is  89 kg (196 lb 1.6 oz). His blood pressure is 135/95 (abnormal) and his pulse is 78. His respiration is 20 and oxygen saturation is 95%.   Vitals:    07/23/24 1646 07/31/24 0605 07/31/24 0630   BP:  (!) 140/102 (!) 135/95   Pulse:  78    Resp:  20    TempSrc:  Oral    SpO2:  95%    Weight: 93 kg (205 lb) 89 kg (196 lb 1.6 oz)    Height: 1.676 m (5' 6\") 1.676 m (5' 6\")         Anesthesia Evaluation     Patient summary reviewed and Nursing notes reviewed    Airway   Mallampati: II  TM distance: >3 FB  Neck ROM: full  Dental      Pulmonary - normal exam   (+) sleep apnea  Cardiovascular - normal exam  Exercise tolerance: good    NYHA Classification: I  ROS comment: '19 palpitations / chest pain  XT  neg    Neuro/Psych    (+)  anxiety/panic attacks,  depression      Comments: Premature birth placement shunt / pt admits never had shunt checked or seen neurology or neurosurgery    GI/Hepatic/Renal    (-) liver disease, renal disease    Endo/Other    (-) diabetes mellitus, hypothyroidism  Abdominal  - normal exam                 Anesthesia Plan:   ASA:  2  Plan:   General  Airway:  ETT  Informed Consent Plan and Risks Discussed With:  Patient and significant other  Discussed plan with:  CRNA, attending and surgeon  Provider Attestation (if preop done by other):  GA/ETT/PONV,dental damages etc      I have informed Noel RAMOS Avello and/or legal guardian or family member of the nature of the anesthetic plan, benefits, risks including possible dental damage if relevant, major complications, and any alternative forms of anesthetic management.   All of the patient's questions were answered to the best of my ability. The patient desires the anesthetic management as planned.  NICOLE MONET MD  7/31/2024 6:56 AM  Present on Admission:  **None**

## 2024-08-01 ENCOUNTER — TELEPHONE (OUTPATIENT)
Dept: OTOLARYNGOLOGY | Facility: CLINIC | Age: 44
End: 2024-08-01

## 2024-08-01 ENCOUNTER — OFFICE VISIT (OUTPATIENT)
Dept: OTOLARYNGOLOGY | Facility: CLINIC | Age: 44
End: 2024-08-01

## 2024-08-01 VITALS — HEIGHT: 66 IN | WEIGHT: 196 LBS | BODY MASS INDEX: 31.5 KG/M2

## 2024-08-01 DIAGNOSIS — J34.3 NASAL TURBINATE HYPERTROPHY: Primary | ICD-10-CM

## 2024-08-01 DIAGNOSIS — J34.2 NASAL SEPTAL DEVIATION: ICD-10-CM

## 2024-08-01 PROCEDURE — 99024 POSTOP FOLLOW-UP VISIT: CPT | Performed by: SPECIALIST

## 2024-08-01 PROCEDURE — 3008F BODY MASS INDEX DOCD: CPT | Performed by: SPECIALIST

## 2024-08-01 RX ORDER — ESCITALOPRAM OXALATE 20 MG/1
TABLET ORAL
COMMUNITY
Start: 2024-07-26

## 2024-08-01 NOTE — TELEPHONE ENCOUNTER
Post op day 1 - Nasal septoplasty, bilateral coblation of inferior turbinates     Pt has an appt today with Dr. Dick at 0950     DISCHARGE INSTRUCTIONS  Elevate head of bed  No aspirin, advil, aleve, ibuprofen, or motrin  Follow up in [place] office tomorrow at 9:50 am. Eat and take pain medication prior to visit  Change mustache dressing as needed  Tylenol or norco for pain  Prescriptions = norco and zithromycin  Call with any problems.

## 2024-08-02 ENCOUNTER — OFFICE VISIT (OUTPATIENT)
Dept: OTOLARYNGOLOGY | Facility: CLINIC | Age: 44
End: 2024-08-02

## 2024-08-02 DIAGNOSIS — J34.3 NASAL TURBINATE HYPERTROPHY: Primary | ICD-10-CM

## 2024-08-02 DIAGNOSIS — J34.2 NASAL SEPTAL DEVIATION: ICD-10-CM

## 2024-08-02 NOTE — PROGRESS NOTES
Postoperative day #1  Patient's status post septoplasty and Coblation of bilateral inferior turbinates.  No complaints.  Physical examination:  Nose: Nasal packing was not in place.  Nasal splints in place and in good position.  Impression: Healing as expected.  Plan: Follow-up in 1 days time, sooner if problems.

## 2024-08-02 NOTE — PATIENT INSTRUCTIONS
Your nasal packing was out at the time of your visit.  No significant bleeding.  Nasal splints in good position.  Follow-up in 1 days time to get the nasal splints removed.

## 2024-08-03 NOTE — PATIENT INSTRUCTIONS
Your nasal splints were removed.  Septum straight and suture line intact.  Okay to blow your nose gently.  No nasal sprays.  Follow-up in 2 weeks time, sooner if problems.

## 2024-08-20 ENCOUNTER — OFFICE VISIT (OUTPATIENT)
Dept: OTOLARYNGOLOGY | Facility: CLINIC | Age: 44
End: 2024-08-20

## 2024-08-20 VITALS — HEIGHT: 66 IN | BODY MASS INDEX: 31.5 KG/M2 | WEIGHT: 196 LBS

## 2024-08-20 DIAGNOSIS — J34.3 NASAL TURBINATE HYPERTROPHY: Primary | ICD-10-CM

## 2024-08-20 DIAGNOSIS — J34.2 NASAL SEPTAL DEVIATION: ICD-10-CM

## 2024-08-20 PROCEDURE — 3008F BODY MASS INDEX DOCD: CPT | Performed by: SPECIALIST

## 2024-08-20 PROCEDURE — 99024 POSTOP FOLLOW-UP VISIT: CPT | Performed by: SPECIALIST

## 2024-08-20 NOTE — PROGRESS NOTES
Postoperative day #20  Patient's status post septoplasty and Coblation of bilateral inferior turbinates.  No complaints.  Physical examination:  Nose: Very small amount of inferior spur still present on the left however markedly straighter and patent bilateral nasal airway.  Septum intact.  Very slight nasal crusting.  Impression: Well-healed with very good improvement over baseline breathing.  Plan: Follow-up with any additional questions or problems.  Okay to resume nasal sprays.

## 2024-08-20 NOTE — PATIENT INSTRUCTIONS
Septum markedly straighter and well-healed.  Okay to resume nasal sprays.  Follow-up with any additional questions or problems.

## 2024-08-26 RX ORDER — ESCITALOPRAM OXALATE 20 MG/1
20 TABLET ORAL DAILY
Qty: 30 TABLET | Refills: 5 | Status: SHIPPED | OUTPATIENT
Start: 2024-08-26

## 2024-08-26 NOTE — TELEPHONE ENCOUNTER
Dr Dick- please sign pended order for Escitalopram 20 mg, if agreeable    Last office visit: 7/8/24  Last refill: 2/23/24

## 2024-11-04 RX ORDER — ROPINIROLE 0.5 MG/1
0.5 TABLET, FILM COATED ORAL NIGHTLY
Qty: 90 TABLET | Refills: 3 | Status: SHIPPED | OUTPATIENT
Start: 2024-11-04

## 2024-11-04 NOTE — TELEPHONE ENCOUNTER
Dr Dick- please sign pended order for ropinarole 0.5 mg, if agreeable    Last office visit: 7/8/24  Last refill: 7/8/24

## 2024-12-02 ENCOUNTER — TELEPHONE (OUTPATIENT)
Dept: PULMONOLOGY | Facility: CLINIC | Age: 44
End: 2024-12-02

## 2024-12-05 ENCOUNTER — LAB ENCOUNTER (OUTPATIENT)
Dept: LAB | Age: 44
End: 2024-12-05
Attending: INTERNAL MEDICINE
Payer: COMMERCIAL

## 2024-12-05 DIAGNOSIS — G25.81 RLS (RESTLESS LEGS SYNDROME): ICD-10-CM

## 2024-12-05 LAB
BASOPHILS # BLD AUTO: 0.1 X10(3) UL (ref 0–0.2)
BASOPHILS NFR BLD AUTO: 1.4 %
DEPRECATED RDW RBC AUTO: 36.1 FL (ref 35.1–46.3)
EOSINOPHIL # BLD AUTO: 0.27 X10(3) UL (ref 0–0.7)
EOSINOPHIL NFR BLD AUTO: 3.8 %
ERYTHROCYTE [DISTWIDTH] IN BLOOD BY AUTOMATED COUNT: 12 % (ref 11–15)
HCT VFR BLD AUTO: 42.9 %
HGB BLD-MCNC: 15.4 G/DL
IMM GRANULOCYTES # BLD AUTO: 0.02 X10(3) UL (ref 0–1)
IMM GRANULOCYTES NFR BLD: 0.3 %
IRON SATN MFR SERPL: 28 %
IRON SERPL-MCNC: 106 UG/DL
LYMPHOCYTES # BLD AUTO: 1.61 X10(3) UL (ref 1–4)
LYMPHOCYTES NFR BLD AUTO: 22.9 %
MCH RBC QN AUTO: 29.5 PG (ref 26–34)
MCHC RBC AUTO-ENTMCNC: 35.9 G/DL (ref 31–37)
MCV RBC AUTO: 82.2 FL
MONOCYTES # BLD AUTO: 0.64 X10(3) UL (ref 0.1–1)
MONOCYTES NFR BLD AUTO: 9.1 %
NEUTROPHILS # BLD AUTO: 4.38 X10 (3) UL (ref 1.5–7.7)
NEUTROPHILS # BLD AUTO: 4.38 X10(3) UL (ref 1.5–7.7)
NEUTROPHILS NFR BLD AUTO: 62.5 %
PLATELET # BLD AUTO: 262 10(3)UL (ref 150–450)
RBC # BLD AUTO: 5.22 X10(6)UL
TIBC SERPL-MCNC: 373 UG/DL (ref 250–425)
TRANSFERRIN SERPL-MCNC: 250 MG/DL (ref 215–365)
WBC # BLD AUTO: 7 X10(3) UL (ref 4–11)

## 2024-12-05 PROCEDURE — 36415 COLL VENOUS BLD VENIPUNCTURE: CPT

## 2024-12-05 PROCEDURE — 85025 COMPLETE CBC W/AUTO DIFF WBC: CPT

## 2024-12-05 PROCEDURE — 84466 ASSAY OF TRANSFERRIN: CPT

## 2024-12-05 PROCEDURE — 83540 ASSAY OF IRON: CPT

## 2024-12-06 NOTE — TELEPHONE ENCOUNTER
Patient is returning the nurses call.    Retention Suture Text: Retention sutures were placed to support the closure and prevent dehiscence.

## 2024-12-09 NOTE — TELEPHONE ENCOUNTER
CBC with differential with platelet, iron and Tibc done 12/5/24    Patient Communication     Edit Comments   Add Notifications  Back to Top    Noel, your blood testing is unremarkable.  Babak CORRALES

## 2025-01-08 RX ORDER — ATORVASTATIN CALCIUM 10 MG/1
10 TABLET, FILM COATED ORAL EVERY EVENING
Qty: 30 TABLET | Refills: 5 | Status: SHIPPED | OUTPATIENT
Start: 2025-01-08

## 2025-01-08 NOTE — TELEPHONE ENCOUNTER
Dr. Dick- please sign pended order for atorvastatin 10 MG, if agreeable    Last office visit: 7/8/2024 Follow up: none Last refill: 7/8/2024

## 2025-01-13 ENCOUNTER — TELEPHONE (OUTPATIENT)
Dept: PULMONOLOGY | Facility: CLINIC | Age: 45
End: 2025-01-13

## 2025-01-13 NOTE — TELEPHONE ENCOUNTER
Received 1/9/25 chart notes from [facility]. Report placed in Dr. Dick's folder for review.

## 2025-01-26 ENCOUNTER — APPOINTMENT (OUTPATIENT)
Dept: GENERAL RADIOLOGY | Facility: HOSPITAL | Age: 45
End: 2025-01-26
Attending: STUDENT IN AN ORGANIZED HEALTH CARE EDUCATION/TRAINING PROGRAM
Payer: COMMERCIAL

## 2025-01-26 ENCOUNTER — HOSPITAL ENCOUNTER (EMERGENCY)
Facility: HOSPITAL | Age: 45
Discharge: HOME | End: 2025-01-26
Attending: STUDENT IN AN ORGANIZED HEALTH CARE EDUCATION/TRAINING PROGRAM
Payer: COMMERCIAL

## 2025-01-26 VITALS
TEMPERATURE: 97 F | WEIGHT: 205 LBS | BODY MASS INDEX: 32.95 KG/M2 | SYSTOLIC BLOOD PRESSURE: 145 MMHG | OXYGEN SATURATION: 95 % | HEART RATE: 76 BPM | HEIGHT: 66 IN | DIASTOLIC BLOOD PRESSURE: 107 MMHG | RESPIRATION RATE: 16 BRPM

## 2025-01-26 DIAGNOSIS — T14.8XXA WOUND PAIN: Primary | ICD-10-CM

## 2025-01-26 PROCEDURE — 99283 EMERGENCY DEPT VISIT LOW MDM: CPT

## 2025-01-26 PROCEDURE — 73130 X-RAY EXAM OF HAND: CPT | Performed by: STUDENT IN AN ORGANIZED HEALTH CARE EDUCATION/TRAINING PROGRAM

## 2025-01-26 PROCEDURE — 99284 EMERGENCY DEPT VISIT MOD MDM: CPT

## 2025-01-26 RX ORDER — ACETAMINOPHEN 500 MG
1000 TABLET ORAL ONCE
Status: COMPLETED | OUTPATIENT
Start: 2025-01-26 | End: 2025-01-26

## 2025-01-26 RX ORDER — ACETAMINOPHEN 500 MG
1000 TABLET ORAL EVERY 6 HOURS PRN
Qty: 56 TABLET | Refills: 0 | Status: SHIPPED | OUTPATIENT
Start: 2025-01-26 | End: 2025-02-02

## 2025-01-26 NOTE — ED PROVIDER NOTES
[facility] Emergency Department Note  Patient: Noel Chen Age: 44 year old Sex: male      MRN: [id number]  : 1980    Patient Seen in: [facility] Emergency Department    History     Chief Complaint   Patient presents with    Arm or Hand Injury     Stated Complaint: hand injury    History obtained from: Patient    44-year-old male with a past medical history of anxiety and depression presenting today for evaluation of right hand lesion.  He states that he has had a callus or blister lesion on the palm of his right hand over the past 1 year.  He states that he has tried several attempts to remove it.  He states that he is try to clean the area with peroxide.  He also states that he tried to harvinder the wound at home.  He states that the wound is painful and he decided tonight that he wanted to get evaluated.  He denies any associated swelling, redness, fevers.  He states that previously, it has been draining pus but no drainage today.  He denies any associated traumatic injury.    Review of Systems:  Review of Systems  Positive for stated complaint: hand injury. Constitutional and vital signs reviewed. All other systems reviewed and negative except as noted above.    Patient History:  Past Medical History:    Anxiety    Depression    History of brain shunt    Hx of motion sickness    Sleep apnea    doesn't use CPAP       Past Surgical History:   Procedure Laterality Date    Appendectomy      Brain surgery      brain shunt at birth    Other surgical history  2024    Septoplasty and Coblation of bilateral inferior turbinates        Family History   Problem Relation Age of Onset    Cancer Father     Diabetes Mother     Heart Disorder Mother        Specific Social Determinants of Health:   Social History     Socioeconomic History    Marital status: Single   Tobacco Use    Smoking status: Never    Smokeless tobacco: Never   Vaping Use    Vaping status: Never Used   Substance and Sexual Activity     Alcohol use: No    Drug use: No   Other Topics Concern    Caffeine Concern Yes     Comment: 1 cup    Pt has a pacemaker No    Reaction to local anesthetic No   Social History Narrative    ** Merged History Encounter **                PSFH elements reviewed from today and agreed except as otherwise stated in HPI.    Physical Exam     ED Triage Vitals [01/26/25 0236]   BP (!) 150/95   Pulse 83   Resp 16   Temp 97.3 °F (36.3 °C)   Temp src    SpO2 96 %   O2 Device None (Room air)       Current:BP (!) 145/107   Pulse 76   Temp 97.3 °F (36.3 °C)   Resp 16   Ht 167.6 cm (5' 6\")   Wt 93 kg   SpO2 95%   BMI 33.09 kg/m²         Physical Exam  Constitutional:       Appearance: He is well-developed.   HENT:      Head: Normocephalic and atraumatic.      Right Ear: External ear normal.      Left Ear: External ear normal.      Nose: Nose normal.   Eyes:      Conjunctiva/sclera: Conjunctivae normal.      Pupils: Pupils are equal, round, and reactive to light.   Cardiovascular:      Rate and Rhythm: Normal rate and regular rhythm.      Heart sounds: Normal heart sounds.   Pulmonary:      Effort: Pulmonary effort is normal.      Breath sounds: Normal breath sounds.   Abdominal:      General: Bowel sounds are normal.      Palpations: Abdomen is soft.      Tenderness: There is no abdominal tenderness.   Musculoskeletal:         General: Normal range of motion.      Cervical back: Normal range of motion and neck supple.   Skin:     General: Skin is warm and dry.      Findings: No rash.      Comments: Approximately 1 cm 1 cm callus on the palmar aspect of the right hand at the base of the fourth digit with small subcentimeter scabbing in the center of the lesion, no purulence or fluctuation, no surrounding erythema, no significant tenderness   Neurological:      General: No focal deficit present.      Mental Status: He is alert and oriented to person, place, and time.      Deep Tendon Reflexes: Reflexes are normal and symmetric.    Psychiatric:         Mood and Affect: Mood normal.         Behavior: Behavior normal.         ED Course   Labs:   Labs Reviewed - No data to display  Radiology findings:  I personally reviewed the images.   No results found.    XR right hand 4 views  IMPRESSION:  No acute fracture or malalignment.  No focal soft tissue swelling.  No radiopaque foreign body identified.        MDM   44-year-old male with past medical history of anxiety and depression presenting for evaluation of 1 year of a right hand palmar lesion.  On exam, he is.  Several 1 cm callus at the base of the right fourth digit with central area of scabbing.  No surrounding erythema.  No significant tenderness.  No fluctuance or drainage.    Differential diagnoses considered includes, but is not limited to:, Plantar wart, callus, retained foreign body,    Will obtain the following tests: X-ray right hand  Please see ED course for my independent review of these tests/imaging results.    Initial Medications/Therapeutics administered: Tylenol         ED course: I independently reviewed the right hand x-rays that show no evidence of radiopaque foreign body.  Agree with radiology read above.  Recommended close PCP and hand surgery follow-up for further management of his chronic skin lesion.  No indication for emergent intervention at this time.  No indication for antibiotics.  Recommended Tylenol and ibuprofen as needed for pain.  Stable at time of discharge.  Return precautions provided and all questions answered.  Patient expressed understanding and agree with plan      Disposition and Plan     Clinical Impression:  1. Wound pain        Disposition:  Discharge    Follow-up:  Bobby Dick MD  [address]  [facility] [address]  [phone]    Schedule an appointment as soon as possible for a visit in 2 day(s)  As needed, If symptoms worsen    Shaq Prasad MD  [address]  [facility]  [address]  [phone]    Schedule an appointment as soon as possible for a visit in 1 week(s)  As needed, If symptoms worsen      Medications Prescribed:  Discharge Medication List as of 1/26/2025  4:55 AM        START taking these medications    Details   !! acetaminophen 500 MG Oral Tab Take 2 tablets (1,000 mg total) by mouth every 6 (six) hours as needed for Pain., Normal, Disp-56 tablet, R-0       !! - Potential duplicate medications found. Please discuss with provider.            This note may have been created using voice dictation technology and may include inadvertent errors.      Ailyn Felder MD  Emergency Medicine

## 2025-01-26 NOTE — ED INITIAL ASSESSMENT (HPI)
Had a callous or blister on the palm of his right hand for about 2mons and he attempted to open it and drain it, did not get any drainage out but is now here for pain to the injury

## 2025-01-26 NOTE — DISCHARGE INSTRUCTIONS
Please cover the area with bacitracin and Band-Aids.  Return if you develop any worsening pain, redness, swelling or drainage is to be signs of an infection

## 2025-07-09 NOTE — TELEPHONE ENCOUNTER
Last seen: 7/8/24  Suggested follow up: 1 year  Next appointment: none  Last refill: 1/8/25  Last lipid: 7/13/24      MyChart message sent, appointment and lipid needed.

## 2025-07-16 NOTE — TELEPHONE ENCOUNTER
Please call patient to discuss other concerns.     Last office visit 7/8/2024  Last Lipid 7/13/2024

## 2025-07-17 RX ORDER — ATORVASTATIN CALCIUM 10 MG/1
10 TABLET, FILM COATED ORAL EVERY EVENING
Qty: 30 TABLET | Refills: 5 | Status: SHIPPED | OUTPATIENT
Start: 2025-07-17

## 2025-07-17 NOTE — TELEPHONE ENCOUNTER
Per patient has appointment with urologist 7/22. He complains of aching pain when urinating and feels a lump in right groin area which is painful with pressure & even just when standing onset 2 months. Denies any other symptoms. States works for [employer] & was carrying 45 lb boxes. Aware RN will follow up after discussing with MD.

## 2025-07-18 NOTE — TELEPHONE ENCOUNTER
Bobby Dick MD to [facility] Clinical Staff       7/18/25  2:28 PM  RN, traci to refill this x 5

## 2025-07-18 NOTE — TELEPHONE ENCOUNTER
Dr. Dick- do you have any orders related to aching pain when urinating and patient feeling a lump in right groin area which is painful with pressure & even just when standing onset 2 months. He works at [employer] and was carrying 45 lb boxes. Patient has appointment with urology 7/22.

## 2025-07-21 NOTE — PROGRESS NOTES
formerly [facility]rology  Initial Office Consultation    HPI:   Noel Chen is a 44 year old male with PMHx of anxiety, depression,  shunt, and sleep apnea here today for groin pain.     The patient states he noticed a lump in his right groin approximately two months ago, as well as an achey pain when urinating. Was lifting heavy boxes at work prior to the onset of pain.  Onset: 2 months ago, Laterality: right, Severity: 3-8/10  Exacerbating/Alleviating factors: worse with lifting, bending  He denies f/c, n/v.    Gross hematuria: none  Tobacco hx: none  Kidney stone hx: unsure  Fam h/o  malignancy: none    Past Medical History[1]  Past Surgical History[2]  Family History[3]  Short Social Hx on File[4]  Current Medications[5]    Allergies: Amoxicillin, Bee venom, Penicillins, Wasps, Ceclor [cefaclor], Penicillins, Amoxicillin, and Peg-8 beeswax [polyethylene glycol]    REVIEW OF SYSTEMS:  Review of Systems   All other systems reviewed and are negative.        EXAM:  There were no vitals taken for this visit.    Physical Exam  Constitutional:       Appearance: Normal appearance.   HENT:      Head: Normocephalic and atraumatic.      Mouth/Throat:      Mouth: Mucous membranes are moist.   Pulmonary:      Effort: Pulmonary effort is normal.   Abdominal:      General: Abdomen is flat.      Palpations: Abdomen is soft.      Hernia: A hernia is present. Hernia is present in the right inguinal area.      Comments: Mass and tenderness noted to right inguinal area, most likely hernia   Genitourinary:     Penis: Normal.       Testes: Normal.      Epididymis:      Right: Normal.      Left: Normal.   Skin:     General: Skin is warm and dry.   Neurological:      Mental Status: He is alert and oriented to person, place, and time.   Psychiatric:         Mood and Affect: Mood normal.         Thought Content: Thought content normal.          LABS:  No results found for: \"PSA\", \"QPSA\", \"TOTPSASCREEN\"  Lab Results   Component  Value Date    WBC 7.0 12/05/2024    RBC 5.22 12/05/2024    HGB 15.4 12/05/2024    HCT 42.9 12/05/2024    MCV 82.2 12/05/2024    MCH 29.5 12/05/2024    MCHC 35.9 12/05/2024    RDW 12.0 12/05/2024    .0 12/05/2024     Lab Results   Component Value Date     (H) 10/24/2022    BUN 19 (H) 10/24/2022    BUNCREA 23.8 (H) 10/24/2022    CREATSERUM 0.80 10/24/2022    ANIONGAP 5 10/24/2022    GFRNAA 104 08/08/2021    GFRAA 121 08/08/2021    CA 8.1 (L) 10/24/2022     10/24/2022    K 3.8 10/24/2022     10/24/2022    CO2 31.0 10/24/2022     No results found for: \"PTP\", \"PT\", \"INR\"    IMAGING:  No results found.    IMPRESSION:  Right Groin Pain     Discussed with patient indications to present to the ER, including fevers, nausea, vomiting, and intractable pain. Patient expressed understanding.    PLAN:  - Scrotal US  - Groin US  - Referral to Gen Surg  - Follow-up based on results of imaging    Emily Sandhuler, APRN  7/22/2025         [1]   Past Medical History:   Anxiety    Depression    History of brain shunt    Hx of motion sickness    Sleep apnea    doesn't use CPAP   [2]   Past Surgical History:  Procedure Laterality Date    Appendectomy      Brain surgery      brain shunt at birth    Other surgical history  07/24/2024    Septoplasty and Coblation of bilateral inferior turbinates   [3]   Family History  Problem Relation Age of Onset    Cancer Father     Diabetes Mother     Heart Disorder Mother    [4]   Social History  Socioeconomic History    Marital status: Single   Tobacco Use    Smoking status: Never    Smokeless tobacco: Never   Vaping Use    Vaping status: Never Used   Substance and Sexual Activity    Alcohol use: No    Drug use: No   Other Topics Concern    Caffeine Concern Yes     Comment: 1 cup    Pt has a pacemaker No    Reaction to local anesthetic No   Social History Narrative    ** Merged History Encounter **        [5]   Current Outpatient Medications   Medication Sig Dispense Refill     ATORVASTATIN 10 MG Oral Tab TAKE 1 TABLET BY MOUTH EVERY DAY IN THE EVENING 30 tablet 5    ROPINIROLE 0.5 MG Oral Tab TAKE 1 TABLET BY MOUTH NIGHTLY 90 tablet 3    ESCITALOPRAM 20 MG Oral Tab TAKE 1 TABLET BY MOUTH EVERY DAY 30 tablet 5    azithromycin (ZITHROMAX Z-MADY) 250 MG Oral Tab Take 1 by oral route every day for 5 days. 2 tablets today. 6 tablet 0    HYDROcodone-acetaminophen  MG Oral Tab Take 1 tablet by mouth every 4 (four) hours as needed for Pain. 20 tablet 0    EPINEPHRINE 0.3 MG/0.3ML Injection Solution Auto-injector USE AS DIRECTED 1 each 1    ALPRAZOLAM 1 MG Oral Tab TAKE 1 TABLET BY MOUTH EVERY 6 HOURS AS NEEDED 30 tablet 0    acetaminophen 325 MG Oral Tab Take 2 tablets (650 mg total) by mouth every 6 (six) hours as needed.  0

## 2025-07-21 NOTE — TELEPHONE ENCOUNTER
Spoke to patient. He is aware refill of atorvastatin was sent to pharmacy & there were no additional orders from Dr. Dick at this time. Reassured him to keep his appointment with urology tomorrow.

## 2025-07-22 ENCOUNTER — OFFICE VISIT (OUTPATIENT)
Dept: SURGERY | Facility: CLINIC | Age: 45
End: 2025-07-22

## 2025-07-22 DIAGNOSIS — R10.31 RIGHT GROIN PAIN: Primary | ICD-10-CM

## 2025-07-22 PROCEDURE — 99204 OFFICE O/P NEW MOD 45 MIN: CPT

## 2025-07-22 NOTE — PATIENT INSTRUCTIONS
Call [phone] to schedule imaging    Call [phone] to schedule appointment with general surgery

## 2025-07-28 ENCOUNTER — OFFICE VISIT (OUTPATIENT)
Dept: SURGERY | Facility: CLINIC | Age: 45
End: 2025-07-28

## 2025-07-28 VITALS
HEART RATE: 77 BPM | DIASTOLIC BLOOD PRESSURE: 97 MMHG | WEIGHT: 205 LBS | HEIGHT: 66 IN | BODY MASS INDEX: 32.95 KG/M2 | SYSTOLIC BLOOD PRESSURE: 152 MMHG

## 2025-07-28 DIAGNOSIS — R10.31 RIGHT GROIN PAIN: Primary | ICD-10-CM

## 2025-07-28 PROCEDURE — 3077F SYST BP >= 140 MM HG: CPT | Performed by: SURGERY

## 2025-07-28 PROCEDURE — 99214 OFFICE O/P EST MOD 30 MIN: CPT | Performed by: SURGERY

## 2025-07-28 PROCEDURE — 3008F BODY MASS INDEX DOCD: CPT | Performed by: SURGERY

## 2025-07-28 PROCEDURE — 3080F DIAST BP >= 90 MM HG: CPT | Performed by: SURGERY

## 2025-07-28 NOTE — H&P
Chief complaint: R groin pain    HPI: Noel is a [employer] employee who presents for consult for R groin pain. US of the scrotum and groin has been ordered but not done yet. Pt has pain when lifting.     Past medical history: Past Medical History[1]    Past surgical history: Past Surgical History[2]    Allergies: Allergies[3]    Medications: Current Medications[4]    Social history:   Set as collapsible by default.[5]     Family history:  Family History[6]     Review of Systems:   GENERAL: feels generally well  SKIN: no ulcerated or worrisome skin lesions  EYES:denies blurred vision or double vision  HEENT: denies new nasal congestion, sinus pain or ST  LUNGS: denies shortness of breath with exertion  CARDIOVASCULAR: denies chest pain on exertion  GI: no hematemesis, no BRBPR, no worsening heartburn  : no dysuria, no blood in urine, no difficulty urinating  MUSCULOSKELETAL: no new musculoskeletal complaints  NEURO: no persistent, recurrent  headaches  PSYCHE:no depression or anxiety  HEMATOLOGIC: no hx of blood dyscrasia  ENDOCRINE: no new endocrine problems  ALL/ASTHMA: no new hx of severe allergy or asthma  BACK: normal, no spinal deformity, no CVA tenderness    Physical examination:     Constitutional: appears well hydrated alert and responsive no acute distress noted  HEENT wnl, anicteric, PERRL, normocephalic, atraumatic  Neck supple, norm ROM, no JVD  L CTA B  H Reg rate  Abd soft, NT, ND, no masses, no definite hernias supine, standing, valsalva, no HSM.  Extr no c/c/e  Skin intact, no jaundice, no rashes, no lesions  Neuro grossly intact, no focal deficits, no tremors  Back no deformity, no CVA tnd.         Assessment and plan:  Diagnoses and all orders for this visit:    Right groin pain       Proceed with US as ordered. Discussed hernia, groin strain, hydrocele, epidydimal cysts.   Activity as tolerated.   RTC.     Jessie Vargas MD  7/28/2025  11:36 AM       [1]   Past Medical History:   Anxiety     Depression    History of brain shunt    Hx of motion sickness    Sleep apnea    doesn't use CPAP   [2]   Past Surgical History:  Procedure Laterality Date    Appendectomy      Brain surgery      brain shunt at birth    Other surgical history  07/24/2024    Septoplasty and Coblation of bilateral inferior turbinates   [3]   Allergies  Allergen Reactions    Amoxicillin HIVES     Denies blisters, skin peeling or organ damage    Bee Venom ANAPHYLAXIS    Penicillins SWELLING     Denies blisters, skin peeling or organ damage    Wasps ANAPHYLAXIS    Ceclor [Cefaclor]     Penicillins     Amoxicillin RASH     Patient dont know     Peg-8 Beeswax [Polyethylene Glycol] RASH   [4]   Current Outpatient Medications   Medication Sig Dispense Refill    ATORVASTATIN 10 MG Oral Tab TAKE 1 TABLET BY MOUTH EVERY DAY IN THE EVENING 30 tablet 5    ROPINIROLE 0.5 MG Oral Tab TAKE 1 TABLET BY MOUTH NIGHTLY 90 tablet 3    ESCITALOPRAM 20 MG Oral Tab TAKE 1 TABLET BY MOUTH EVERY DAY 30 tablet 5    azithromycin (ZITHROMAX Z-MADY) 250 MG Oral Tab Take 1 by oral route every day for 5 days. 2 tablets today. 6 tablet 0    HYDROcodone-acetaminophen  MG Oral Tab Take 1 tablet by mouth every 4 (four) hours as needed for Pain. 20 tablet 0    EPINEPHRINE 0.3 MG/0.3ML Injection Solution Auto-injector USE AS DIRECTED 1 each 1    ALPRAZOLAM 1 MG Oral Tab TAKE 1 TABLET BY MOUTH EVERY 6 HOURS AS NEEDED 30 tablet 0    acetaminophen 325 MG Oral Tab Take 2 tablets (650 mg total) by mouth every 6 (six) hours as needed.  0   [5]   Social History  Socioeconomic History    Marital status: Single   Tobacco Use    Smoking status: Never    Smokeless tobacco: Never   Vaping Use    Vaping status: Never Used   Substance and Sexual Activity    Alcohol use: No    Drug use: No   [6]   Family History  Problem Relation Age of Onset    Cancer Father     Diabetes Mother     Heart Disorder Mother

## (undated) DEVICE — PACK CUSTOM NASAL ACCESSORY

## (undated) DEVICE — REFLEX ULTRA 45 WITH INTEGRATED CABLE: Brand: COBLATION

## (undated) DEVICE — SHEET 1532520 10PK SILICONE 5X5CM 1.02MM

## (undated) DEVICE — PACKING 470404 MEROCEL 2000 10PK 8CM: Brand: MEROCEL®

## (undated) DEVICE — ABDOMINAL BINDER: Brand: DEROYAL

## (undated) DEVICE — [HIGH FLOW INSUFFLATOR,  DO NOT USE IF PACKAGE IS DAMAGED,  KEEP DRY,  KEEP AWAY FROM SUNLIGHT,  PROTECT FROM HEAT AND RADIOACTIVE SOURCES.]: Brand: PNEUMOSURE

## (undated) DEVICE — LAP CHOLE: Brand: MEDLINE INDUSTRIES, INC.

## (undated) DEVICE — SUT VICRYL 0 UR-6 J603H

## (undated) DEVICE — ENCORE® LATEX MICRO SIZE 6, STERILE LATEX POWDER-FREE SURGICAL GLOVE: Brand: ENCORE

## (undated) DEVICE — MEGADYNE E-Z CLEAN BLADE 2.75"

## (undated) DEVICE — ETS45 RELOAD STANDARD 45MM: Brand: ENDOPATH

## (undated) DEVICE — PACK CDS HEAD

## (undated) DEVICE — ENSEAL X1 TISSUE SEALER, CURVED JAW, 37 CM SHAFT LENGTH: Brand: ENSEAL

## (undated) DEVICE — EVACUATOR URO RELIVAC 100CC

## (undated) DEVICE — TROCAR: Brand: KII FIOS FIRST ENTRY

## (undated) DEVICE — APPLICATOR COTTON TIP 6IN

## (undated) DEVICE — PAD,EYE,LARGE,2 1/8"X2 5/8",STERILE,LF: Brand: MEDLINE

## (undated) DEVICE — TROCAR: Brand: KII® SLEEVE

## (undated) DEVICE — SOLUTION IRRIG 1000ML 0.9% NACL USP BTL

## (undated) DEVICE — SUT PROL 4-0 18IN N ABSRB BLU L19MM FS-2

## (undated) DEVICE — SUCTION CANISTER, 3000CC,SAFELINER: Brand: DEROYAL

## (undated) DEVICE — UNDYED BRAIDED (POLYGLACTIN 910), SYNTHETIC ABSORBABLE SUTURE: Brand: COATED VICRYL

## (undated) DEVICE — SUT ETHILON 2-0 FS 664H

## (undated) DEVICE — ENDOPATH ETS-FLEX45 ARTICULATING ENDOSCOPIC LINEAR CUTTER, NO RELOAD: Brand: ENDOPATH

## (undated) DEVICE — DRAIN SILICONE FLAT 10X20

## (undated) DEVICE — TISSUE RETRIEVAL SYSTEM: Brand: INZII RETRIEVAL SYSTEM

## (undated) DEVICE — SOLUTION  .9 1000ML BTL

## (undated) DEVICE — 3 ML SYRINGE LUER-LOCK TIP: Brand: MONOJECT

## (undated) DEVICE — SUTURE CHRM GUT 4-0 18IN ABSRB UD 17MM J-1

## (undated) DEVICE — TROCARS: Brand: KII® BALLOON BLUNT TIP SYSTEM

## (undated) DEVICE — STANDARD HYPODERMIC NEEDLE,POLYPROPYLENE HUB: Brand: MONOJECT

## (undated) DEVICE — SKIN PREP TRAY 4 COMPARTM TRAY: Brand: MEDLINE INDUSTRIES, INC.

## (undated) DEVICE — GAMMEX® PI HYBRID SIZE 7.5, STERILE POWDER-FREE SURGICAL GLOVE, POLYISOPRENE AND NEOPRENE BLEND: Brand: GAMMEX

## (undated) NOTE — LETTER
4/21/2023             RE: Lisa Sanchez        [address] [id number]         To Whom It May Concern,    Alejandro Deluca is a patient of mine. 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 Thank you for your understanding in this matter.       Sincerely,    MD [facility], [address] [place] [address]  [address] [facility]  [address]  [address] [facility] [address]  [phone]

## (undated) NOTE — LETTER
8/2/2024          To Whom It May Concern:    Noel Chen is currently under my medical care and may not return to work at this time.    Please excuse Noel   He may return to work on 8/12/24.  Activity is restricted as follows: none.    If you require additional information please contact our office.        Sincerely,    Xiomara Dick MD

## (undated) NOTE — LETTER
[address]  Authorization for Surgical Operation and Procedure                                                                                           1. I hereby Jael Kaplan MD, my physician and his/her assistants (if applicable), which may include medical students, residents, and/or fellows, to perform the following surgical operation/ procedure and administer such anesthesia as may be determined necessary by my physician: Operation/Procedure name (s) LAPAROSCOPIC APPENDECTOMY on Noel Chen   2. I recognize that during the surgical operation/procedure, unforeseen conditions may necessitate additional or different procedures than those listed above. I, therefore, further authorize and request that the above-named surgeon, assistants, or designees perform such procedures as are, in their judgment, necessary and desirable. 3.   My surgeon/physician has discussed prior to my surgery the potential benefits, risks and side effects of this procedure; the likelihood of achieving goals; and potential problems that might occur during recuperation. They also discussed reasonable alternatives to the procedure, including risks, benefits, and side effects related to the alternatives and risks related to not receiving this procedure. I have had all my questions answered and I acknowledge that no guarantee has been made as to the result that may be obtained. 4.   Should the need arise during my operation/procedure, which includes change of level of care prior to discharge, I also consent to the administration of blood and/or blood products. Further, I understand that despite careful testing and screening of blood or blood products by collecting agencies, I may still be subject to ill effects as a result of receiving a blood transfusion and/or blood products.   The following are some, but not all, of the potential risks that can occur: fever and allergic reactions, hemolytic reactions, transmission of diseases such as Hepatitis, AIDS and Cytomegalovirus (CMV) and fluid overload. In the event that I wish to have an autologous transfusion of my own blood, or a directed donor transfusion, I will discuss this with my physician. Check only if Refusing Blood or Blood Products  I understand refusal of blood or blood products as deemed necessary by my physician may have serious consequences to my condition to include possible death. I hereby assume responsibility for my refusal and release the hospital, its personnel, and my physicians from any responsibility for the consequences of my refusal.           ____ Refuse      5. I authorize the use of any specimen, organs, tissues, body parts or foreign objects that may be removed from my body during the operation/procedure for diagnosis, research or teaching purposes and their subsequent disposal by hospital authorities. I also authorize the release of specimen test results and/or written reports to my treating physician on the hospital medical staff or other referring or consulting physicians involved in my care, at the discretion of the Pathologist or my treating physician. 6.   I consent to the photographing or videotaping of the operations or procedures to be performed, including appropriate portions of my body for medical, scientific, or educational purposes, provided my identity is not revealed by the pictures or by descriptive texts accompanying them. If the procedure has been photographed/videotaped, the surgeon will obtain the original picture, image, videotape or CD. The hospital will not be responsible for storage, release or maintenance of the picture, image, tape or CD.    7.   I consent to the presence of a  or observers in the operating room as deemed necessary by my physician or their designees.     8.   I recognize that in the event my procedure results in extended X-Ray/fluoroscopy time, I may develop a skin reaction. 9. If I have a Do Not Attempt Resuscitation (DNAR) order in place, that status will be suspended while in the operating room, procedural suite, and during the recovery period unless otherwise explicitly stated by me (or a person authorized to consent on my behalf). The surgeon or my attending physician will determine when the applicable recovery period ends for purposes of reinstating the DNAR order. 10. Patients having a sterilization procedure: I understand that if the procedure is successful the results will be permanent and it will therefore be impossible for me to inseminate, conceive, or bear children. I also understand that the procedure is intended to result in sterility, although the result has not been guaranteed. 11. I acknowledge that my physician has explained sedation/analgesia administration to me including the risk and benefits I consent to the administration of sedation/analgesia as may be necessary or desirable in the judgment of my physician. I CERTIFY THAT I HAVE READ AND FULLY UNDERSTAND THE ABOVE CONSENT TO OPERATION and/or OTHER PROCEDURE.     _________________________________________ _________________________________     ___________________________________  Signature of Patient     Signature of Responsible Person                   Printed Name of Responsible Person                              _________________________________________ ______________________________        ___________________________________  Signature of Witness         Date  Time         Relationship to Patient    STATEMENT OF PHYSICIAN My signature below affirms that prior to the time of the procedure; I have explained to the patient and/or his/her legal representative, the risks and benefits involved in the proposed treatment and any reasonable alternative to the proposed treatment.  I have also explained the risks and benefits involved in refusal of the proposed treatment and alternatives to the proposed treatment and have answered the patient's questions.  If I have a significant financial interest in a co-management agreement or a significant financial interest in any product or implant, or other significant relationship used in this procedure/surgery, I have disclosed this and had a discussion with my patient.     _______________________________________________________________ _____________________________  Lia León of Physician)                                                                                         (Date)                                   (Time)  Patient Name: Joel Porras    : 1980   Printed: 10/21/2022      Medical Record #: [id number]                                              Page 1 of 1

## (undated) NOTE — MR AVS SNAPSHOT
[address] [facility]  [address] [place] [id number] [phone]               Thank you for choosing us for your health care visit with Peña Rajput MD.  We are glad to serve you and happy to provide you with this summary of your visit. your Zip Code and Date of Birth to complete the sign-up process. If you do not sign up before the expiration date, you must request a new code.     Your unique People to Remember Access Code: [email or web address]  Expires: 6/2/2017  1:33 PM    If you have questions, you can ca

## (undated) NOTE — Clinical Note
Spoke with patient--sent appt question to office staff for TCM/HFU appt clarification--thank you.  Future Appointments 11/1/2022  2:00 PM    Mariama Dill MD              [facility]             [facility] 2/27/2023  2:45 PM    Tiana iDck MD       [facility]

## (undated) NOTE — LETTER
12/6/2022          To Whom It May Concern:    Kerry Altman is currently under my medical care and may not return to work at this time. He may return to work on January 21, 2023. Activity is restricted as follows: No restrictions. If you require additional information please contact our office.         Sincerely,          Sarah Alex MD          Document generated by:   Nagi Ambrocio RN

## (undated) NOTE — LETTER
12/6/2022          To Whom It May Concern:    Reid Sargent is currently under my medical care and may not return to work at this time. He may return to work on January 19, 2023. Activity is restricted as follows: No restrictions. If you require additional information please contact our office.     Sincerely,          Nimco Aguilar MD          Document generated by:  Nagi Ambrocio RN

## (undated) NOTE — LETTER
11/1/2022          To Whom It May Concern:    Noel Roy is currently under my medical care due to surgery on 10/21/22. Magdalena Bowen may return to work on Thursday, 12/15/2022. Activity is restricted as follows: No lifting over 15 lbs until after 12/16/22. If you require additional information please contact our office.         Sincerely,    Marciomichele Salter MD          Document generated by:  Karie Rodas RN